# Patient Record
Sex: FEMALE | Race: OTHER | HISPANIC OR LATINO | Employment: FULL TIME | URBAN - METROPOLITAN AREA
[De-identification: names, ages, dates, MRNs, and addresses within clinical notes are randomized per-mention and may not be internally consistent; named-entity substitution may affect disease eponyms.]

---

## 2018-05-07 ENCOUNTER — OFFICE VISIT (OUTPATIENT)
Dept: URGENT CARE | Facility: PHYSICIAN GROUP | Age: 28
End: 2018-05-07
Payer: COMMERCIAL

## 2018-05-07 VITALS
BODY MASS INDEX: 25.49 KG/M2 | TEMPERATURE: 97.8 F | RESPIRATION RATE: 16 BRPM | SYSTOLIC BLOOD PRESSURE: 118 MMHG | WEIGHT: 153 LBS | OXYGEN SATURATION: 100 % | DIASTOLIC BLOOD PRESSURE: 80 MMHG | HEART RATE: 64 BPM | HEIGHT: 65 IN

## 2018-05-07 DIAGNOSIS — Z34.90 EARLY STAGE OF PREGNANCY: ICD-10-CM

## 2018-05-07 PROCEDURE — 99202 OFFICE O/P NEW SF 15 MIN: CPT | Performed by: FAMILY MEDICINE

## 2018-05-07 NOTE — PROGRESS NOTES
"Chief Complaint   Patient presents with   • Pregnancy Problem     at home hcg test positive,            LMP - \"sometime last year\".    Had Mirena IUD pulled 3/26    She took 2 urine pregnancy tests  - both positive.   She is here for confirmation.       Past medical history was unremarkable and not pertinent to current issue  Social hx - denies tobacco, alcohol, drug use  Family hx was reviewed - no pertinent past family hx        Review of Systems:  Review of Systems   Constitutional: Negative for fever.   HENT: no neck pain, headache or dizziness  Eyes: denies vision changes  Respiratory: no cough, congestion, SOB  Cardiovascular: denies palpations, chest pain   Gastrointestinal: denies diarrhea, abdominal pain or constipation.  No blood in stool.  Musculoskeletal: denies back pain or joint pain    Skin: no itching or rash  Neurological: No numbness or tingling.       OBJECTIVE  Blood pressure 118/80, pulse 64, temperature 36.6 °C (97.8 °F), resp. rate 16, height 1.651 m (5' 5\"), weight 69.4 kg (153 lb), last menstrual period 2018, SpO2 100 %.    HEENT - EOMI  Neuro - alert and oriented x3.    Lungs - CTA.    Heart - regular rate       Musculoskeletal - No lower extremity edema noted.  Urszula's sign negative bilaterally        A/P:      Urine Hcg positive  Advised prenatal vitamins qd    Referred to ob/gyn        "

## 2018-09-23 ENCOUNTER — HOSPITAL ENCOUNTER (OUTPATIENT)
Dept: RADIOLOGY | Facility: MEDICAL CENTER | Age: 28
End: 2018-09-23
Attending: OBSTETRICS & GYNECOLOGY
Payer: COMMERCIAL

## 2018-09-23 DIAGNOSIS — Z34.82 PRENATAL CARE, SUBSEQUENT PREGNANCY, SECOND TRIMESTER: ICD-10-CM

## 2018-09-23 PROCEDURE — 76805 OB US >/= 14 WKS SNGL FETUS: CPT

## 2018-12-31 ENCOUNTER — HOSPITAL ENCOUNTER (INPATIENT)
Facility: MEDICAL CENTER | Age: 28
LOS: 2 days | End: 2019-01-02
Attending: OBSTETRICS & GYNECOLOGY | Admitting: OBSTETRICS & GYNECOLOGY
Payer: COMMERCIAL

## 2018-12-31 LAB
ALBUMIN SERPL BCP-MCNC: 3.4 G/DL (ref 3.2–4.9)
ALBUMIN/GLOB SERPL: 1.1 G/DL
ALP SERPL-CCNC: 202 U/L (ref 30–99)
ALT SERPL-CCNC: 9 U/L (ref 2–50)
ANION GAP SERPL CALC-SCNC: 9 MMOL/L (ref 0–11.9)
AST SERPL-CCNC: 13 U/L (ref 12–45)
BASOPHILS # BLD AUTO: 0.4 % (ref 0–1.8)
BASOPHILS # BLD: 0.04 K/UL (ref 0–0.12)
BILIRUB SERPL-MCNC: 0.4 MG/DL (ref 0.1–1.5)
BUN SERPL-MCNC: 6 MG/DL (ref 8–22)
CALCIUM SERPL-MCNC: 9.1 MG/DL (ref 8.5–10.5)
CHLORIDE SERPL-SCNC: 107 MMOL/L (ref 96–112)
CO2 SERPL-SCNC: 20 MMOL/L (ref 20–33)
CREAT SERPL-MCNC: 0.5 MG/DL (ref 0.5–1.4)
CREAT UR-MCNC: 38.1 MG/DL
EOSINOPHIL # BLD AUTO: 0.18 K/UL (ref 0–0.51)
EOSINOPHIL NFR BLD: 1.8 % (ref 0–6.9)
ERYTHROCYTE [DISTWIDTH] IN BLOOD BY AUTOMATED COUNT: 44.3 FL (ref 35.9–50)
GLOBULIN SER CALC-MCNC: 3.2 G/DL (ref 1.9–3.5)
GLUCOSE SERPL-MCNC: 95 MG/DL (ref 65–99)
HCT VFR BLD AUTO: 38.4 % (ref 37–47)
HGB BLD-MCNC: 12.4 G/DL (ref 12–16)
HOLDING TUBE BB 8507: NORMAL
IMM GRANULOCYTES # BLD AUTO: 0.06 K/UL (ref 0–0.11)
IMM GRANULOCYTES NFR BLD AUTO: 0.6 % (ref 0–0.9)
LYMPHOCYTES # BLD AUTO: 2 K/UL (ref 1–4.8)
LYMPHOCYTES NFR BLD: 19.9 % (ref 22–41)
MCH RBC QN AUTO: 26.3 PG (ref 27–33)
MCHC RBC AUTO-ENTMCNC: 32.3 G/DL (ref 33.6–35)
MCV RBC AUTO: 81.5 FL (ref 81.4–97.8)
MONOCYTES # BLD AUTO: 0.49 K/UL (ref 0–0.85)
MONOCYTES NFR BLD AUTO: 4.9 % (ref 0–13.4)
NEUTROPHILS # BLD AUTO: 7.3 K/UL (ref 2–7.15)
NEUTROPHILS NFR BLD: 72.4 % (ref 44–72)
NRBC # BLD AUTO: 0 K/UL
NRBC BLD-RTO: 0 /100 WBC
PLATELET # BLD AUTO: 173 K/UL (ref 164–446)
PMV BLD AUTO: 13 FL (ref 9–12.9)
POTASSIUM SERPL-SCNC: 4 MMOL/L (ref 3.6–5.5)
PROT SERPL-MCNC: 6.6 G/DL (ref 6–8.2)
PROT UR-MCNC: 6.3 MG/DL (ref 0–15)
PROT/CREAT UR: 165 MG/G (ref 10–107)
RBC # BLD AUTO: 4.71 M/UL (ref 4.2–5.4)
SODIUM SERPL-SCNC: 136 MMOL/L (ref 135–145)
URATE SERPL-MCNC: 4.4 MG/DL (ref 1.9–8.2)
WBC # BLD AUTO: 10.1 K/UL (ref 4.8–10.8)

## 2018-12-31 PROCEDURE — A9270 NON-COVERED ITEM OR SERVICE: HCPCS | Performed by: OBSTETRICS & GYNECOLOGY

## 2018-12-31 PROCEDURE — 700111 HCHG RX REV CODE 636 W/ 250 OVERRIDE (IP)

## 2018-12-31 PROCEDURE — 304965 HCHG RECOVERY SERVICES

## 2018-12-31 PROCEDURE — 700105 HCHG RX REV CODE 258

## 2018-12-31 PROCEDURE — 84550 ASSAY OF BLOOD/URIC ACID: CPT

## 2018-12-31 PROCEDURE — 10907ZC DRAINAGE OF AMNIOTIC FLUID, THERAPEUTIC FROM PRODUCTS OF CONCEPTION, VIA NATURAL OR ARTIFICIAL OPENING: ICD-10-PCS | Performed by: OBSTETRICS & GYNECOLOGY

## 2018-12-31 PROCEDURE — 700102 HCHG RX REV CODE 250 W/ 637 OVERRIDE(OP): Performed by: OBSTETRICS & GYNECOLOGY

## 2018-12-31 PROCEDURE — 82570 ASSAY OF URINE CREATININE: CPT

## 2018-12-31 PROCEDURE — 84156 ASSAY OF PROTEIN URINE: CPT

## 2018-12-31 PROCEDURE — 80053 COMPREHEN METABOLIC PANEL: CPT

## 2018-12-31 PROCEDURE — 10H07YZ INSERTION OF OTHER DEVICE INTO PRODUCTS OF CONCEPTION, VIA NATURAL OR ARTIFICIAL OPENING: ICD-10-PCS | Performed by: OBSTETRICS & GYNECOLOGY

## 2018-12-31 PROCEDURE — 85025 COMPLETE CBC W/AUTO DIFF WBC: CPT

## 2018-12-31 PROCEDURE — 770002 HCHG ROOM/CARE - OB PRIVATE (112)

## 2018-12-31 PROCEDURE — 700111 HCHG RX REV CODE 636 W/ 250 OVERRIDE (IP): Performed by: OBSTETRICS & GYNECOLOGY

## 2018-12-31 PROCEDURE — 700105 HCHG RX REV CODE 258: Performed by: OBSTETRICS & GYNECOLOGY

## 2018-12-31 PROCEDURE — 59409 OBSTETRICAL CARE: CPT

## 2018-12-31 RX ORDER — ACETAMINOPHEN 325 MG/1
325 TABLET ORAL EVERY 4 HOURS PRN
Status: DISCONTINUED | OUTPATIENT
Start: 2018-12-31 | End: 2019-01-02 | Stop reason: HOSPADM

## 2018-12-31 RX ORDER — DOCUSATE SODIUM 100 MG/1
100 CAPSULE, LIQUID FILLED ORAL 2 TIMES DAILY PRN
Status: DISCONTINUED | OUTPATIENT
Start: 2018-12-31 | End: 2019-01-02 | Stop reason: HOSPADM

## 2018-12-31 RX ORDER — ONDANSETRON 2 MG/ML
4 INJECTION INTRAMUSCULAR; INTRAVENOUS EVERY 6 HOURS PRN
Status: DISCONTINUED | OUTPATIENT
Start: 2018-12-31 | End: 2019-01-02 | Stop reason: HOSPADM

## 2018-12-31 RX ORDER — SODIUM CHLORIDE, SODIUM LACTATE, POTASSIUM CHLORIDE, CALCIUM CHLORIDE 600; 310; 30; 20 MG/100ML; MG/100ML; MG/100ML; MG/100ML
INJECTION, SOLUTION INTRAVENOUS PRN
Status: DISCONTINUED | OUTPATIENT
Start: 2018-12-31 | End: 2019-01-02 | Stop reason: HOSPADM

## 2018-12-31 RX ORDER — MISOPROSTOL 200 UG/1
600 TABLET ORAL
Status: DISCONTINUED | OUTPATIENT
Start: 2018-12-31 | End: 2019-01-02 | Stop reason: HOSPADM

## 2018-12-31 RX ORDER — IBUPROFEN 600 MG/1
600 TABLET ORAL EVERY 6 HOURS PRN
Status: DISCONTINUED | OUTPATIENT
Start: 2018-12-31 | End: 2019-01-02 | Stop reason: HOSPADM

## 2018-12-31 RX ORDER — ONDANSETRON 4 MG/1
4 TABLET, ORALLY DISINTEGRATING ORAL EVERY 6 HOURS PRN
Status: DISCONTINUED | OUTPATIENT
Start: 2018-12-31 | End: 2019-01-02 | Stop reason: HOSPADM

## 2018-12-31 RX ORDER — SODIUM CHLORIDE, SODIUM LACTATE, POTASSIUM CHLORIDE, CALCIUM CHLORIDE 600; 310; 30; 20 MG/100ML; MG/100ML; MG/100ML; MG/100ML
INJECTION, SOLUTION INTRAVENOUS CONTINUOUS
Status: ACTIVE | OUTPATIENT
Start: 2018-12-31 | End: 2018-12-31

## 2018-12-31 RX ORDER — MISOPROSTOL 200 UG/1
800 TABLET ORAL
Status: COMPLETED | OUTPATIENT
Start: 2018-12-31 | End: 2018-12-31

## 2018-12-31 RX ORDER — HYDROCODONE BITARTRATE AND ACETAMINOPHEN 5; 325 MG/1; MG/1
1 TABLET ORAL EVERY 4 HOURS PRN
Status: DISCONTINUED | OUTPATIENT
Start: 2018-12-31 | End: 2019-01-02 | Stop reason: HOSPADM

## 2018-12-31 RX ORDER — OXYTOCIN 10 [USP'U]/ML
10 INJECTION, SOLUTION INTRAMUSCULAR; INTRAVENOUS
Status: DISCONTINUED | OUTPATIENT
Start: 2018-12-31 | End: 2018-12-31 | Stop reason: HOSPADM

## 2018-12-31 RX ORDER — HYDROCODONE BITARTRATE AND ACETAMINOPHEN 5; 325 MG/1; MG/1
2 TABLET ORAL EVERY 4 HOURS PRN
Status: DISCONTINUED | OUTPATIENT
Start: 2018-12-31 | End: 2019-01-02 | Stop reason: HOSPADM

## 2018-12-31 RX ORDER — SODIUM CHLORIDE, SODIUM LACTATE, POTASSIUM CHLORIDE, CALCIUM CHLORIDE 600; 310; 30; 20 MG/100ML; MG/100ML; MG/100ML; MG/100ML
INJECTION, SOLUTION INTRAVENOUS
Status: COMPLETED
Start: 2018-12-31 | End: 2018-12-31

## 2018-12-31 RX ORDER — OXYCODONE AND ACETAMINOPHEN 10; 325 MG/1; MG/1
1 TABLET ORAL EVERY 6 HOURS PRN
Status: DISCONTINUED | OUTPATIENT
Start: 2018-12-31 | End: 2019-01-02 | Stop reason: HOSPADM

## 2018-12-31 RX ADMIN — Medication 125 ML/HR: at 20:38

## 2018-12-31 RX ADMIN — AMPICILLIN SODIUM 1 G: 1 INJECTION, POWDER, FOR SOLUTION INTRAMUSCULAR; INTRAVENOUS at 16:15

## 2018-12-31 RX ADMIN — Medication 2000 ML/HR: at 19:49

## 2018-12-31 RX ADMIN — SODIUM CHLORIDE, POTASSIUM CHLORIDE, SODIUM LACTATE AND CALCIUM CHLORIDE: 600; 310; 30; 20 INJECTION, SOLUTION INTRAVENOUS at 11:47

## 2018-12-31 RX ADMIN — MISOPROSTOL 800 MCG: 200 TABLET ORAL at 20:08

## 2018-12-31 RX ADMIN — AMPICILLIN SODIUM 2000 MG: 2 INJECTION, POWDER, FOR SOLUTION INTRAMUSCULAR; INTRAVENOUS at 11:46

## 2018-12-31 RX ADMIN — FENTANYL CITRATE 100 MCG: 50 INJECTION, SOLUTION INTRAMUSCULAR; INTRAVENOUS at 18:23

## 2018-12-31 RX ADMIN — SODIUM CHLORIDE, SODIUM LACTATE, POTASSIUM CHLORIDE, CALCIUM CHLORIDE: 600; 310; 30; 20 INJECTION, SOLUTION INTRAVENOUS at 11:47

## 2018-12-31 RX ADMIN — Medication 2 MILLI-UNITS/MIN: at 11:54

## 2018-12-31 ASSESSMENT — PATIENT HEALTH QUESTIONNAIRE - PHQ9
2. FEELING DOWN, DEPRESSED, IRRITABLE, OR HOPELESS: NOT AT ALL
1. LITTLE INTEREST OR PLEASURE IN DOING THINGS: NOT AT ALL
SUM OF ALL RESPONSES TO PHQ9 QUESTIONS 1 AND 2: 0

## 2018-12-31 ASSESSMENT — COPD QUESTIONNAIRES
COPD SCREENING SCORE: 0
IN THE PAST 12 MONTHS DO YOU DO LESS THAN YOU USED TO BECAUSE OF YOUR BREATHING PROBLEMS: DISAGREE/UNSURE
DURING THE PAST 4 WEEKS HOW MUCH DID YOU FEEL SHORT OF BREATH: NONE/LITTLE OF THE TIME
HAVE YOU SMOKED AT LEAST 100 CIGARETTES IN YOUR ENTIRE LIFE: NO/DON'T KNOW
DO YOU EVER COUGH UP ANY MUCUS OR PHLEGM?: NO/ONLY WITH OCCASIONAL COLDS OR INFECTIONS

## 2018-12-31 ASSESSMENT — PAIN SCALES - GENERAL
PAINLEVEL_OUTOF10: 0
PAINLEVEL_OUTOF10: ASSUMED PAIN PRESENT

## 2018-12-31 ASSESSMENT — LIFESTYLE VARIABLES
ALCOHOL_USE: NO
EVER_SMOKED: NEVER

## 2018-12-31 NOTE — CARE PLAN
Problem: Pain  Goal: Alleviation of Pain or a reduction in pain to the patient's comfort goal    Intervention: Initial pain assessment  Pain management discussed and pt would like to labor naturally. Pain medication options discussed with use of  and all pt questions answered at this time.       Problem: Risk for Infection, Impaired Wound Healing  Goal: Remain free from signs and symptoms of infection  Outcome: PROGRESSING AS EXPECTED  Practice proper hand hygiene before and after entering room and after direct pt contact

## 2018-12-31 NOTE — H&P
History and Physical      Jackelyn Tobias is a 28 y.o. female  at  49 weeks who presents for  Labor     Subjective:   positive  For CTXS.   positive Feels pain   negative for LOF  negative for vaginal bleeding.   positive for fetal movement    ROS: Constitutional: negative  Cardiovascular: negative  Gastrointestinal: negative  Genitourinary:negative    History reviewed. No pertinent past medical history.  History reviewed. No pertinent surgical history.  OB History    Para Term  AB Living   1             SAB TAB Ectopic Molar Multiple Live Births                    # Outcome Date GA Lbr Gio/2nd Weight Sex Delivery Anes PTL Lv   1 Current                 Social History     Social History   • Marital status:      Spouse name: N/A   • Number of children: N/A   • Years of education: N/A     Occupational History   • Not on file.     Social History Main Topics   • Smoking status: Never Smoker   • Smokeless tobacco: Never Used   • Alcohol use No   • Drug use: No   • Sexual activity: Not on file     Other Topics Concern   • Not on file     Social History Narrative   • No narrative on file     Allergies: Patient has no known allergies.    Current Facility-Administered Medications:   •  ampicillin (OMNIPEN) 2,000 mg in  mL IVPB, 2,000 mg, Intravenous, Once, Winsome Rodriguez M.D.  •  ampicillin (OMNIPEN) 1 g in  mL IVPB, 1,000 mg, Intravenous, Q4HRS, Winsome Rodriguez M.D.  •  LACTATED RINGERS IV SOLN, , , ,   •  oxytocin (PITOCIN) 20 UNITS/1000ML LR, , , ,   •  LR infusion, , Intravenous, Continuous, Winsome Rodriguez M.D.  •  fentaNYL (SUBLIMAZE) injection 100 mcg, 100 mcg, Intravenous, Q HOUR PRN, Winsome Rodriguez M.D.  •  oxytocin (PITOCIN) injection 10 Units, 10 Units, Intramuscular, Once PRN, Winsome Rodriguez M.D.  •  miSOPROStol (CYTOTEC) tablet 800 mcg, 800 mcg, Rectal, Once PRN, Winsome Rodriguez M.D.    Prenatal care with . starting at [facility]  "trimester  with following problems:  There are no active problems to display for this patient.              Objective:      Blood pressure 137/99, pulse 74, temperature 35.9 °C (96.7 °F), temperature source Temporal, height 1.651 m (5' 5\"), weight 87.1 kg (192 lb), last menstrual period 03/12/2018.    General:   no acute distress, alert, cooperative   Skin:   normal   HEENT:  PERRLA   Lungs:   CTA bilateral   Heart:   S1, S2 normal, no murmur, click, rub or gallop, regular rate and rhythm, brisk carotid upstroke without bruits, peripheral pulses very brisk, chest is clear without rales or wheezing, no pedal edema, no JVD, no hepatosplenomegaly   Abdomen:   gravid, NT   EFW:  3400gms.   Pelvis:  adequate with gynecoid pelvis   FHT:  144 BPM   Uterine Size: S=D   Presentations: Cephalic   Cervix:     Dilation: 2cm    Effacement: 100%    Station:  -3    Consistency: Soft    Position: Middle     Lab Review  Lab:    No results found for this or any previous visit (from the past 5880 hour(s)).     Assessment:   Jackelyn Tobias at Unknown  Labor status: Early latent labor.  Obstetrical history significant for There are no active problems to display for this patient.  .      Plan:     Admit to L&D  GBS positive             "

## 2018-12-31 NOTE — PROGRESS NOTES
1020- Report received from HERI Troncoso. Pt resting in bed with family at bedside. EFM and TOCO reading well. Pt oriented to room. POC discussed and assumed.   1238- Dr Rodriguez at bedside to assess pt. AROM clear fluid. IUPC placed, pt tolerated procedure well.   1610- Dr Rodriguez called. Informed her about the issue with the IUPC not staying placed correctly. She will be up to assess shortly.   1635- Dr Rodriguez at bedside to assess pt. SVE 4/90/-2. IUPC replaced but still not reading well. Orders to replace external TOCO and see if that will read well.  1820- SVE 7/100/0.    1823- IV Fentanyl given per pt request.  1850- Pt feeling pressure. SVE Lip/+1. Dr Rodriguez called and requested for delivery. Report given to HERI Villatoro.   1900- Pt complete.

## 2019-01-01 LAB
ERYTHROCYTE [DISTWIDTH] IN BLOOD BY AUTOMATED COUNT: 44.2 FL (ref 35.9–50)
HCT VFR BLD AUTO: 29.2 % (ref 37–47)
HGB BLD-MCNC: 9.4 G/DL (ref 12–16)
MCH RBC QN AUTO: 26.3 PG (ref 27–33)
MCHC RBC AUTO-ENTMCNC: 32.2 G/DL (ref 33.6–35)
MCV RBC AUTO: 81.8 FL (ref 81.4–97.8)
PLATELET # BLD AUTO: 142 K/UL (ref 164–446)
PMV BLD AUTO: 12.9 FL (ref 9–12.9)
RBC # BLD AUTO: 3.57 M/UL (ref 4.2–5.4)
WBC # BLD AUTO: 16.8 K/UL (ref 4.8–10.8)

## 2019-01-01 PROCEDURE — 700102 HCHG RX REV CODE 250 W/ 637 OVERRIDE(OP): Performed by: OBSTETRICS & GYNECOLOGY

## 2019-01-01 PROCEDURE — 85027 COMPLETE CBC AUTOMATED: CPT

## 2019-01-01 PROCEDURE — 770002 HCHG ROOM/CARE - OB PRIVATE (112)

## 2019-01-01 PROCEDURE — A9270 NON-COVERED ITEM OR SERVICE: HCPCS | Performed by: OBSTETRICS & GYNECOLOGY

## 2019-01-01 PROCEDURE — 36415 COLL VENOUS BLD VENIPUNCTURE: CPT

## 2019-01-01 RX ADMIN — IBUPROFEN 600 MG: 600 TABLET, FILM COATED ORAL at 00:01

## 2019-01-01 ASSESSMENT — PAIN SCALES - GENERAL
PAINLEVEL_OUTOF10: 0
PAINLEVEL_OUTOF10: 4
PAINLEVEL_OUTOF10: 0

## 2019-01-01 ASSESSMENT — PATIENT HEALTH QUESTIONNAIRE - PHQ9
SUM OF ALL RESPONSES TO PHQ9 QUESTIONS 1 AND 2: 0
2. FEELING DOWN, DEPRESSED, IRRITABLE, OR HOPELESS: NOT AT ALL
1. LITTLE INTEREST OR PLEASURE IN DOING THINGS: NOT AT ALL

## 2019-01-01 NOTE — PROGRESS NOTES
Pt transferred to floor from labor and delivery.  Pt oriented to room, safety procedures and call light. Pt is A&Ox4. Denies pain.  Assessment complete. Fundus firm, lochia light.  States voiding w/out difficulty, - for flatus. Plan of care reviewed, call light enc.  Family @ bedside, bonding well w/.

## 2019-01-01 NOTE — PROGRESS NOTES
Doing well, with  at the bedside.  Pt requested wants to take shower today. Provided stuffs needed.  Denies pain, coping well.

## 2019-01-01 NOTE — CARE PLAN
Problem: Pain  Goal: Alleviation of Pain or a reduction in pain to the patient's comfort goal  Outcome: PROGRESSING AS EXPECTED  Pain well controlled. Pt able to breathe through delivery well with family support and RN/MD guidance. Ice provided after delivery, declines medication need.     Problem: Risk for Infection, Impaired Wound Healing  Goal: Remain free from signs and symptoms of infection  Outcome: PROGRESSING AS EXPECTED  Pt free from s/s of infection.

## 2019-01-01 NOTE — PROGRESS NOTES
1900 - Report received from Daniela LIRIANO. Pt complete with strong urge to push with each UC. Dr. Rodriguez on her way to delivery.   1917 - Pt began pushing due to strong urge.   1925 - Dr. Rodriguez at bedside for delivery.   1930 - Bladder drained by MD with straight cath.  1947 - Delivery of viable male. 8/9 APGARS.   1949 - Placenta delivered.   2008 - Cytotec given, after two fundal rubs having gush of moderate rubra, fundus firm. Pt denies any need for pain medication, ice pack provided.   2125 - Bleeding better after dose of Cytotec, scant bleeding, fundus remains firm. Pt up to bathroom, able to void. Belongings gathered. Pt transferred up to postpartum with family. Report given to Lory LIRIANO.

## 2019-01-01 NOTE — CARE PLAN
Problem: Communication  Goal: The ability to communicate needs accurately and effectively will improve  Pt educated on use of call light and white board for communication, pt verbalizes understanding of white board communication and times of rounding.      Problem: Pain Management  Goal: Pain level will decrease to patient's comfort goal  Pain assessed Q2h. Pain medication given per orders, see MAR.

## 2019-01-01 NOTE — PROGRESS NOTES
"Jackelyn Tobias PP day 1    Subjective: Abdominal pain. no, ambulating .yes, tolerating liquids .yes, tolerating regular diet .yes, flatus.yes, BM .yes, Bleeding .yes, voiding .yes,dizziness .no, breast feeding.yes, breast tenderness .no    Blood pressure 123/79, pulse 80, temperature 36.7 °C (98.1 °F), temperature source Temporal, resp. rate 18, height 1.651 m (5' 5\"), weight 87.1 kg (192 lb), last menstrual period 03/12/2018, SpO2 96 %, currently breastfeeding.  Breast Exam: Tenderness .no, Engourgement .no, Mastitis .no  Abdomen soft, non-tender. BS normal. No masses,  No organomegaly  Fundus Tenderness:no, Below umbilicus:Yes,   Perineumperineum intact  Extremities2+ edema    Meds:   No current facility-administered medications on file prior to encounter.      No current outpatient prescriptions on file prior to encounter.       Lab:   Recent Results (from the past 48 hour(s))   PROTEIN/CREAT RATIO URINE    Collection Time: 12/31/18 10:00 AM   Result Value Ref Range    Total Protein, Urine 6.3 0.0 - 15.0 mg/dL    Creatinine, Random Urine 38.10 mg/dL    Protein Creatinine Ratio 165 (H) 10 - 107 mg/g   CBC WITH DIFFERENTIAL    Collection Time: 12/31/18 10:50 AM   Result Value Ref Range    WBC 10.1 4.8 - 10.8 K/uL    RBC 4.71 4.20 - 5.40 M/uL    Hemoglobin 12.4 12.0 - 16.0 g/dL    Hematocrit 38.4 37.0 - 47.0 %    MCV 81.5 81.4 - 97.8 fL    MCH 26.3 (L) 27.0 - 33.0 pg    MCHC 32.3 (L) 33.6 - 35.0 g/dL    RDW 44.3 35.9 - 50.0 fL    Platelet Count 173 164 - 446 K/uL    MPV 13.0 (H) 9.0 - 12.9 fL    Neutrophils-Polys 72.40 (H) 44.00 - 72.00 %    Lymphocytes 19.90 (L) 22.00 - 41.00 %    Monocytes 4.90 0.00 - 13.40 %    Eosinophils 1.80 0.00 - 6.90 %    Basophils 0.40 0.00 - 1.80 %    Immature Granulocytes 0.60 0.00 - 0.90 %    Nucleated RBC 0.00 /100 WBC    Neutrophils (Absolute) 7.30 (H) 2.00 - 7.15 K/uL    Lymphs (Absolute) 2.00 1.00 - 4.80 K/uL    Monos (Absolute) 0.49 0.00 - 0.85 K/uL    Eos (Absolute) " 0.18 0.00 - 0.51 K/uL    Baso (Absolute) 0.04 0.00 - 0.12 K/uL    Immature Granulocytes (abs) 0.06 0.00 - 0.11 K/uL    NRBC (Absolute) 0.00 K/uL   COMP METABOLIC PANEL    Collection Time: 12/31/18 10:50 AM   Result Value Ref Range    Sodium 136 135 - 145 mmol/L    Potassium 4.0 3.6 - 5.5 mmol/L    Chloride 107 96 - 112 mmol/L    Co2 20 20 - 33 mmol/L    Anion Gap 9.0 0.0 - 11.9    Glucose 95 65 - 99 mg/dL    Bun 6 (L) 8 - 22 mg/dL    Creatinine 0.50 0.50 - 1.40 mg/dL    Calcium 9.1 8.5 - 10.5 mg/dL    AST(SGOT) 13 12 - 45 U/L    ALT(SGPT) 9 2 - 50 U/L    Alkaline Phosphatase 202 (H) 30 - 99 U/L    Total Bilirubin 0.4 0.1 - 1.5 mg/dL    Albumin 3.4 3.2 - 4.9 g/dL    Total Protein 6.6 6.0 - 8.2 g/dL    Globulin 3.2 1.9 - 3.5 g/dL    A-G Ratio 1.1 g/dL   URIC ACID    Collection Time: 12/31/18 10:50 AM   Result Value Ref Range    Uric Acid 4.4 1.9 - 8.2 mg/dL   HOLD BLOOD BANK SPECIMEN (NOT TESTED)    Collection Time: 12/31/18 10:50 AM   Result Value Ref Range    Holding Tube - Bb DONE    ESTIMATED GFR    Collection Time: 12/31/18 10:50 AM   Result Value Ref Range    GFR If African American >60 >60 mL/min/1.73 m 2    GFR If Non African American >60 >60 mL/min/1.73 m 2   CBC without differential    Collection Time: 01/01/19  4:08 AM   Result Value Ref Range    WBC 16.8 (H) 4.8 - 10.8 K/uL    RBC 3.57 (L) 4.20 - 5.40 M/uL    Hemoglobin 9.4 (L) 12.0 - 16.0 g/dL    Hematocrit 29.2 (L) 37.0 - 47.0 %    MCV 81.8 81.4 - 97.8 fL    MCH 26.3 (L) 27.0 - 33.0 pg    MCHC 32.2 (L) 33.6 - 35.0 g/dL    RDW 44.2 35.9 - 50.0 fL    Platelet Count 142 (L) 164 - 446 K/uL    MPV 12.9 9.0 - 12.9 fL       Assessment and Plan  normal postpartum course  No heavy bleeding or foul vaginal discharge     Continue Routine postpartum care

## 2019-01-01 NOTE — L&D DELIVERY NOTE
Delivery Note    Obstetrician:   Jennifer.    Pre-Delivery Diagnosis: term     Post-Delivery Diagnosis: Living  infant(s) or Male    Procedure: Spontaneous vaginal delivery     came  In early Labor after AROM and pitocin augmentation progrseed to full dilatation with just fentanyl 1dose for pain.   head delivered in occipito-anterior postion mouth and nose bulb suctioned and rest of the body delivered. Baby cried immediately.   cord clamped and cut.       Episiotomy or Incision: none    Indications for instrumental delivery: none    Infant Wt:pending.    Apgars: 1' 8  /  5' 9     Placenta and Cord:          Mechanism: spontaneous        Description:  complete, 3 vessel cord, membranes intact    Estimated Blood Loss:  200 ml           Total IV Fluids: 1500ml           Specimens: none.           Complications:  none           Condition: stable      Infant Feeding:    breast    Attending Attestation: I was present and scrubbed for the entire procedure.

## 2019-01-02 VITALS
RESPIRATION RATE: 17 BRPM | DIASTOLIC BLOOD PRESSURE: 80 MMHG | HEIGHT: 65 IN | OXYGEN SATURATION: 97 % | BODY MASS INDEX: 31.99 KG/M2 | HEART RATE: 87 BPM | SYSTOLIC BLOOD PRESSURE: 120 MMHG | WEIGHT: 192 LBS | TEMPERATURE: 97.8 F

## 2019-01-02 PROCEDURE — A9270 NON-COVERED ITEM OR SERVICE: HCPCS | Performed by: OBSTETRICS & GYNECOLOGY

## 2019-01-02 PROCEDURE — 700102 HCHG RX REV CODE 250 W/ 637 OVERRIDE(OP): Performed by: OBSTETRICS & GYNECOLOGY

## 2019-01-02 RX ADMIN — IBUPROFEN 600 MG: 600 TABLET, FILM COATED ORAL at 08:07

## 2019-01-02 ASSESSMENT — PATIENT HEALTH QUESTIONNAIRE - PHQ9
2. FEELING DOWN, DEPRESSED, IRRITABLE, OR HOPELESS: NOT AT ALL
SUM OF ALL RESPONSES TO PHQ9 QUESTIONS 1 AND 2: 0
1. LITTLE INTEREST OR PLEASURE IN DOING THINGS: NOT AT ALL

## 2019-01-02 ASSESSMENT — EDINBURGH POSTNATAL DEPRESSION SCALE (EPDS)
THE THOUGHT OF HARMING MYSELF HAS OCCURRED TO ME: NEVER
I HAVE BEEN ANXIOUS OR WORRIED FOR NO GOOD REASON: NO, NOT AT ALL
I HAVE BLAMED MYSELF UNNECESSARILY WHEN THINGS WENT WRONG: NO, NEVER
I HAVE BEEN ABLE TO LAUGH AND SEE THE FUNNY SIDE OF THINGS: AS MUCH AS I ALWAYS COULD
I HAVE BEEN SO UNHAPPY THAT I HAVE BEEN CRYING: NO, NEVER
I HAVE FELT SCARED OR PANICKY FOR NO GOOD REASON: NO, NOT AT ALL
I HAVE FELT SAD OR MISERABLE: NO, NOT AT ALL
I HAVE LOOKED FORWARD WITH ENJOYMENT TO THINGS: AS MUCH AS I EVER DID
THINGS HAVE BEEN GETTING ON TOP OF ME: NO, I HAVE BEEN COPING AS WELL AS EVER
I HAVE BEEN SO UNHAPPY THAT I HAVE HAD DIFFICULTY SLEEPING: NOT AT ALL

## 2019-01-02 ASSESSMENT — PAIN SCALES - GENERAL
PAINLEVEL_OUTOF10: 0
PAINLEVEL_OUTOF10: 2
PAINLEVEL_OUTOF10: 0

## 2019-01-02 NOTE — PROGRESS NOTES
0700- Report received from KLAUDIA Wallace, RN. Pt sleeping with pt's mother at the bedside.   0800- pt provided with EPDS, pt denies depression at this time. Pt and family educated on S/S of PPD and resources for depression, pt encouraged to notify physician if any symptoms are present.  0900- EPDS score- 0.  1115- pt requesting to receive consult with , Linda notified.  1130- Linda at the bedside, pt provided with pediatrician list, WIC information, and Medicaid information provided, all questions answered by .  1225- Dr. Rodriguez discharged pt home. Vesna SMITH, HERI to provide discharge instructions.

## 2019-01-02 NOTE — CARE PLAN
Problem: Infection  Goal: Will remain free from infection    Intervention: Implement standard precautions and perform hand washing before and after patient contact  Pt and family encouraged to utilize hand  to prevent risk of infection.       Problem: Fluid Volume:  Goal: Will maintain balanced intake and output    Intervention: Monitor, educate, and encourage compliance with therapeutic intake of liquids  Pt encouraged to drinks fluids to maintain fluid balance.

## 2019-01-02 NOTE — DISCHARGE PLANNING
:    Notified by RN patient is requesting to speak with SW.  Met with patient, FOB, and family member at the bedside.  MOB stated she would like a list of pediatricians for infant and states she does not have insurance for infant.  Asked MOB about her insurance-[employer] and MOB stated it's too expensive to add baby onto it.  Provided MOB with a children and family resource list with the phone number to the Medicaid office.  Gave mother a list of pediatricians and also information on applying for WIC.  MOB denies needing any additional resources.      Plan:  Nothing further.

## 2019-01-02 NOTE — CARE PLAN
Problem: Infection  Goal: Will remain free from infection  Outcome: PROGRESSING AS EXPECTED  Pt exhibits no signs of infection, afebrile.    Problem: Pain Management  Goal: Pain level will decrease to patient's comfort goal  Outcome: PROGRESSING AS EXPECTED  Pt denies discomfort at this time. States Motrin is effective as needed.

## 2019-01-02 NOTE — DISCHARGE SUMMARY
Discharge Summary:      Jackelyn Tobias      Admit Date:   2018  Discharge Date:  2019     Admitting diagnosis:  Pregnancy  Labor and delivery, indication for care  Discharge Diagnosis: Status post vaginal, spontaneous.  Pregnancy Complications: none  Tubal Ligation:  no        History:  History reviewed. No pertinent past medical history.  OB History    Para Term  AB Living   1 1 1     1   SAB TAB Ectopic Molar Multiple Live Births           0 1      # Outcome Date GA Lbr Gio/2nd Weight Sex Delivery Anes PTL Lv   1 Term 18 39w5d / 00:47 3.125 kg (6 lb 14.2 oz) M Vag-Spont None N QUIN           Patient has no known allergies.  There are no active problems to display for this patient.       Hospital Course:   28 y.o. , now para 1, was admitted with the above mentioned diagnosis, underwent Active Labor, vaginal, spontaneous. Patient postpartum course was unremarkable, with progressive advancement in diet , ambulation and toleration of oral analgesia. Patient without complaints today and desires discharge.      Vitals:    19 0400 19 0800 19 2000 19 0800   BP: 125/72 123/79 137/82 120/80   Pulse: 85 80 100 87   Resp:    Temp: 36.7 °C (98 °F) 36.7 °C (98.1 °F) 37.1 °C (98.7 °F) 36.6 °C (97.8 °F)   TempSrc: Temporal Temporal Temporal Temporal   SpO2: 96% 96% 96% 97%   Weight:       Height:           Current Facility-Administered Medications   Medication Dose   • ondansetron (ZOFRAN ODT) dispertab 4 mg  4 mg    Or   • ondansetron (ZOFRAN) syringe/vial injection 4 mg  4 mg   • oxytocin (PITOCIN) infusion (for postpartum)   mL/hr   • HYDROcodone-acetaminophen (NORCO) 5-325 MG per tablet 1 Tab  1 Tab   • acetaminophen (TYLENOL) tablet 325 mg  325 mg   • HYDROcodone-acetaminophen (NORCO) 5-325 MG per tablet 2 Tab  2 Tab   • oxytocin (PITOCIN) 20 UNITS/1000ML LR (induction of labor)  0.5-20 pamela-units/min   • LR infusion     • PRN oxytocin  (PITOCIN) (20 Units/1000 mL) PRN for excessive uterine bleeding - See Admin Instr  125-999 mL/hr   • miSOPROStol (CYTOTEC) tablet 600 mcg  600 mcg   • docusate sodium (COLACE) capsule 100 mg  100 mg   • oxyCODONE-acetaminophen (PERCOCET-10)  MG per tablet 1 Tab  1 Tab   • ibuprofen (MOTRIN) tablet 600 mg  600 mg       Exam:  Breast Exam: negative  Abdomen: Abdomen soft, non-tender. BS normal. No masses,  No organomegaly  Fundus Non Tender: yes  Perineum: perineum intact  Extremity: extremities, peripheral pulses and reflexes normal     Labs:  Recent Labs      12/31/18   1050  01/01/19   0408   WBC  10.1  16.8*   RBC  4.71  3.57*   HEMOGLOBIN  12.4  9.4*   HEMATOCRIT  38.4  29.2*   MCV  81.5  81.8   MCH  26.3*  26.3*   MCHC  32.3*  32.2*   RDW  44.3  44.2   PLATELETCT  173  142*   MPV  13.0*  12.9        Activity:   Discharge to home  Pelvic Rest x 6 weeks    Assessment:  normal postpartum course  Discharge Assessment: No heavy bleeding or foul vaginal discharge      Follow up: [facility] in 5 weeks for vaginal .     Discharge Meds:   No current outpatient prescriptions on file.       Winsome Rodriguez M.D.

## 2019-01-02 NOTE — PROGRESS NOTES
1900-Assumed care of pt, POC discussed. Pt concerned about breastfeeding. Education provided. Nurse assist infant to latch to right side. FOB at bedside providing emotional support.  VSS. Fundus firm, lochia scant. Pt ambulating to BRP with ease.     2000- Pt states she would prefer to use formula for the baby. Reinforced breastfeeding education with pt, validated pt feelings. Gave Similac and instructed on amounts to feed and time frames. Pt verbalized understanding. Verbalized relief.     0620- Mom in bed with baby at side. Reinforced safety issues with having baby in bed with Mom. Pt verbalized understanding. States she will put baby in open crib to sleep.

## 2019-01-02 NOTE — DISCHARGE INSTRUCTIONS
POSTPARTUM DISCHARGE INSTRUCTIONS FOR MOM    YOB: 1990   Age: 28 y.o.               Admit Date: 12/31/2018     Discharge Date: 1/2/2019  Attending Doctor:  Winsome Rodriguez M.D.                  Allergies:  Patient has no known allergies.    Discharged to home by car. Discharged via wheelchair, hospital escort: Yes.  Special equipment needed: Not Applicable  Belongings with: Personal  Be sure to schedule a follow-up appointment with your primary care doctor or any specialists as instructed.     Discharge Plan:   Diet Plan: Discussed  Activity Level: Discussed  Confirmed Follow up Appointment: Patient to Call and Schedule Appointment  Confirmed Symptoms Management: Discussed  Medication Reconciliation Updated: Yes  Influenza Vaccine Indication: Patient Refuses    REASONS TO CALL YOUR OBSTETRICIAN:  1.   Persistent fever or shaking chills (Temperature higher than 100.4)  2.   Heavy bleeding (soaking more than 1 pad per hour); Passing clots  3.   Foul odor from vagina  4.   Mastitis (Breast infection; breast pain, chills, fever, redness)  5.   Urinary pain, burning or frequency  6.   Episiotomy infection    8.   Severe depression longer than 24 hours    HAND WASHING  · Prior to handling the baby.  · Before breastfeeding or bottle feeding baby.  · After using the bathroom or changing the baby's diaper.        VAGINAL CARE  · Nothing inside vagina for 6 weeks: no sexual intercourse, tampons or douching.  · Bleeding may continue for 2-4 weeks.  Amount may vary.    · Call your physician for heavy bleeding which means soaking more than 1 pad per hour    BIRTH CONTROL  · It is possible to become pregnant at any time after delivery and while breastfeeding.  · Plan to discuss a method of birth control with your physician at your follow up visit. visit.    DIET AND ELIMINATION  · Eating more fiber (bran cereal, fruits, and vegetables) and drinking plenty of fluids will help to avoid constipation.  · Urinary  "frequency after childbirth is normal.    POSTPARTUM BLUES  During the first few days after birth, you may experience a sense of the \"blues\" which may include impatience, irritability or even crying.  These feeling come and go quickly.  However, as many as 1 in 10 women experience emotional symptoms known as postpartum depression.    Postpartum depression:  May start as early as the second or third day after delivery or take several weeks or months to develop.  Symptoms of \"blues\" are present, but are more intense:  Crying spells; loss of appetite; feelings of hopelessness or loss of control; fear of touching the baby; over concern or no concern at all about the baby; little or no concern about your own appearance/caring for yourself; and/or inability to sleep or excessive sleeping.  Contact your physician if you are experiencing any of these symptoms.    Crisis Hotline:  · [place] Crisis Hotline:  7-630-DWIVBIH  Or 1-324.442.4611  · [place] Crisis Hotline:  1-627.103.3989  Or 563-975-7961    PREVENTING SHAKEN BABY:  If you are angry or stressed, PUT THE BABY IN THE CRIB, step away, take some deep breaths, and wait until you are calm to care for the baby.  DO NOT SHAKE THE BABY.  You are not alone, call a supporter for help.    · Crisis Call Center 24/7 crisis line 398-484-9923 or 1-732.827.2140  · You can also text them, text \"ANSWER\" to 429971    QUIT SMOKING/TOBACCO USE:  I understand the use of any tobacco products increases my chance of suffering from future heart disease and could cause other illnesses which may shorten my life. Quitting the use of tobacco products is the single most important thing I can do to improve my health. For further information on smoking / tobacco cessation call a Toll Free Quit Line at 1-261.742.3873 (*National Cancer [place]) or 1-790.605.5895 (American Lung Association) or you can access the web based program at www.lungusa.org.    · [place] Tobacco Users Help Line:  (094) " 707-2028       Toll Free: 9-416-721-2680  · Quit Tobacco Program [facility] (253)939-8116    DEPRESSION / SUICIDE RISK:  As you are discharged from this [facility] facility, it is important to learn how to keep safe from harming yourself.    Recognize the warning signs:  · Abrupt changes in personality, positive or negative- including increase in energy   · Giving away possessions  · Change in eating patterns- significant weight changes-  positive or negative  · Change in sleeping patterns- unable to sleep or sleeping all the time   · Unwillingness or inability to communicate  · Depression  · Unusual sadness, discouragement and loneliness  · Talk of wanting to die  · Neglect of personal appearance   · Rebelliousness- reckless behavior  · Withdrawal from people/activities they love  · Confusion- inability to concentrate     If you or a loved one observes any of these behaviors or has concerns about self-harm, here's what you can do:  · Talk about it- your feelings and reasons for harming yourself  · Remove any means that you might use to hurt yourself (examples: pills, rope, extension cords, firearm)  · Get professional help from the community (Mental Health, Substance Abuse, psychological counseling)  · Do not be alone:Call your Safe Contact- someone whom you trust who will be there for you.  · Call your local CRISIS HOTLINE 998-2470 or 210-942-2426  · Call your local Children's Mobile Crisis Response Team [place] (647) 921-2310 or www.Thinkature  · Call the toll free National Suicide Prevention Hotlines   · National Suicide Prevention Lifeline 226-411-NHRQ (8163)  · National Hope Line Network 800-SUICIDE (874-2141)    DISCHARGE SURVEY:  Thank you for choosing [facility].  We hope we provided you with very good care.  You may be receiving a survey in the mail.  Please fill it out.  Your opinion is valuable to us.    ADDITIONAL EDUCATIONAL MATERIALS GIVEN TO PATIENT:        My  signature on this form indicates that:  1.  I have reviewed and understand the above information  2.  My questions regarding this information have been answered to my satisfaction.  3.  I have formulated a plan with my discharge nurse to obtain my prescribed medication for home.

## 2020-01-27 ENCOUNTER — INITIAL PRENATAL (OUTPATIENT)
Dept: OBGYN | Facility: CLINIC | Age: 30
End: 2020-01-27

## 2020-01-27 VITALS
WEIGHT: 162 LBS | SYSTOLIC BLOOD PRESSURE: 122 MMHG | BODY MASS INDEX: 26.99 KG/M2 | HEIGHT: 65 IN | DIASTOLIC BLOOD PRESSURE: 72 MMHG

## 2020-01-27 DIAGNOSIS — N93.8 DUB (DYSFUNCTIONAL UTERINE BLEEDING): ICD-10-CM

## 2020-01-27 DIAGNOSIS — Z32.01 PREGNANCY EXAMINATION OR TEST, POSITIVE RESULT: Primary | ICD-10-CM

## 2020-01-27 LAB
INT CON NEG: NEGATIVE
INT CON POS: POSITIVE
POC URINE PREGNANCY TEST: POSITIVE

## 2020-01-27 PROCEDURE — 81025 URINE PREGNANCY TEST: CPT | Performed by: NURSE PRACTITIONER

## 2020-01-27 PROCEDURE — 99202 OFFICE O/P NEW SF 15 MIN: CPT | Performed by: NURSE PRACTITIONER

## 2020-01-27 SDOH — HEALTH STABILITY: MENTAL HEALTH: HOW OFTEN DO YOU HAVE A DRINK CONTAINING ALCOHOL?: NEVER

## 2020-01-27 NOTE — PROGRESS NOTES
"Jackelyn Tobias,  30 y.o.  female with Patient's last menstrual period was 10/03/2019 (exact date). presents today with complaint of dysfunctional uterine bleeding.    Subjective : Patient presents to the office for absent menses.    Nausea/Vomiting: Sometimes    Abdominal /pelvic cramping: No  Vaginal bleeding: one time, several weeks ago  Positive home UPT yes  Partner involved  Other symptoms: tiredness    Pertinent positives documented in HPI and all other systems reviewed & are negative    OB History    Para Term  AB Living   2 1 1     1   SAB TAB Ectopic Molar Multiple Live Births           0 1      # Outcome Date GA Lbr Gio/2nd Weight Sex Delivery Anes PTL Lv   2 Current            1 Term 18 39w5d / 00:47 3.125 kg (6 lb 14.2 oz) M Vag-Spont None N QUIN       Past Gyn history: last pap , hx STDs no    History reviewed. No pertinent past medical history.    History reviewed. No pertinent surgical history.    Meds: prenatal vitamins    Allergies: Patient has no known allergies.    Physical Exam:    /72   Ht 1.651 m (5' 5\")   Wt 73.5 kg (162 lb)   LMP 10/03/2019 (Exact Date)   BMI 26.96 kg/m²   Gen: well-appearing, well-hydrated, well-nourished, in no apparent distress, pleasant and verbal  Lungs: Clear  Heart: RRR No M  Abd: abdomen is soft without significant tenderness, masses, organomegaly or guarding  Pelvic: deferred  Ext: NT bilaterally, no cyanosis, clubbing or edema    Recent Results (from the past 336 hour(s))   POCT Pregnancy    Collection Time: 20 10:37 AM   Result Value Ref Range    POC Urine Pregnancy Test POSITIVE Negative    Internal Control Positive Positive     Internal Control Negative Negative        Ultrasound: deferred  Doppler: FHTs-150    Assessment:  30 y.o.   amenorrhea  Pregnancy exam/test positive    Plan:  2 weeks for new OB appt  Pap smear at NOB  Normal pregnancy symptoms discussed  SAB/labor precautions " educated  Prenatal labs and OB US ordered, pt to have drawn after financial appt  Drink at least 2 liters of water daily  Exercise 30 minutes daily  Call MD w/ questions or concerns

## 2020-01-27 NOTE — PROGRESS NOTES
New Pt/Pregnancy test  Positive UPT today done in clinic.   LMP: 10/03/2019  WT: 162 lb  BP: 122/72  Pt states in November she had one episode of vaginal bleeding, states she has not had any bleeding or spotting since.   Good # [phone]

## 2020-02-04 ENCOUNTER — TELEPHONE (OUTPATIENT)
Dept: OBGYN | Facility: CLINIC | Age: 30
End: 2020-02-04

## 2020-02-05 NOTE — TELEPHONE ENCOUNTER
Pt LM on VM stating she is unable to come to her appt tomorrow and requesting to cancel appt.  Appt canceled

## 2020-05-08 ENCOUNTER — HOSPITAL ENCOUNTER (OUTPATIENT)
Facility: MEDICAL CENTER | Age: 30
End: 2020-05-08
Attending: NURSE PRACTITIONER
Payer: COMMERCIAL

## 2020-05-08 ENCOUNTER — OFFICE VISIT (OUTPATIENT)
Dept: OBGYN | Facility: CLINIC | Age: 30
End: 2020-05-08

## 2020-05-08 ENCOUNTER — INITIAL PRENATAL (OUTPATIENT)
Dept: OBGYN | Facility: CLINIC | Age: 30
End: 2020-05-08

## 2020-05-08 VITALS — SYSTOLIC BLOOD PRESSURE: 116 MMHG | DIASTOLIC BLOOD PRESSURE: 78 MMHG | BODY MASS INDEX: 24.86 KG/M2 | WEIGHT: 149.4 LBS

## 2020-05-08 DIAGNOSIS — Z34.83 ENCOUNTER FOR SUPERVISION OF OTHER NORMAL PREGNANCY, THIRD TRIMESTER: ICD-10-CM

## 2020-05-08 DIAGNOSIS — E04.9 ENLARGED THYROID: ICD-10-CM

## 2020-05-08 DIAGNOSIS — O09.33 LATE PRENATAL CARE AFFECTING PREGNANCY IN THIRD TRIMESTER: ICD-10-CM

## 2020-05-08 DIAGNOSIS — Z34.83 ENCOUNTER FOR SUPERVISION OF OTHER NORMAL PREGNANCY, THIRD TRIMESTER: Primary | ICD-10-CM

## 2020-05-08 LAB
APPEARANCE UR: CLEAR
BILIRUB UR STRIP-MCNC: NORMAL MG/DL
COLOR UR AUTO: YELLOW
GLUCOSE UR STRIP.AUTO-MCNC: NEGATIVE MG/DL
KETONES UR STRIP.AUTO-MCNC: NEGATIVE MG/DL
LEUKOCYTE ESTERASE UR QL STRIP.AUTO: NORMAL
NITRITE UR QL STRIP.AUTO: NEGATIVE
PH UR STRIP.AUTO: 7 [PH] (ref 5–8)
PROT UR QL STRIP: NEGATIVE MG/DL
RBC UR QL AUTO: NORMAL
SP GR UR STRIP.AUTO: 1.01
UROBILINOGEN UR STRIP-MCNC: NORMAL MG/DL

## 2020-05-08 ASSESSMENT — ENCOUNTER SYMPTOMS
PSYCHIATRIC NEGATIVE: 1
RESPIRATORY NEGATIVE: 1
NEUROLOGICAL NEGATIVE: 1
CARDIOVASCULAR NEGATIVE: 1
MUSCULOSKELETAL NEGATIVE: 1
CONSTITUTIONAL NEGATIVE: 1
EYES NEGATIVE: 1
GASTROINTESTINAL NEGATIVE: 1

## 2020-05-08 ASSESSMENT — FIBROSIS 4 INDEX: FIB4 SCORE: 0.88

## 2020-05-08 NOTE — PROGRESS NOTES
S:  Jackelyn Tobias is a 30 y.o.  who presents for her new OB exam.  She is 31w1d with and NED of Estimated Date of Delivery: 20 based off of LMP . She has no complaints.  She is currently not working, denies heavy lifting or chemical exposure. Denies ER visits or previous care in this pregnancy but did have  here.     Too late for  AFP.  Declines CF.  Denies VB, LOF, or cramping.  Denies dysuria, vaginal DC. Reports normal fetal movement.     Pt is  and lives with FOB and other child.  Pregnancy is unplanned but welcomed.      Discussed diet and exercise during pregnancy. Encouraged good nutrition, and daily exercise including walking or swimming. Discussed expected weight gain during pregnancy. Discussed adequate hydration during pregnancy.  Review of Systems   Constitutional: Negative.    HENT: Negative.    Eyes: Negative.    Respiratory: Negative.    Cardiovascular: Negative.    Gastrointestinal: Negative.    Genitourinary: Negative.    Musculoskeletal: Negative.    Skin: Negative.    Neurological: Negative.    Endo/Heme/Allergies: Negative.    Psychiatric/Behavioral: Negative.    All other systems reviewed and are negative.      History reviewed. No pertinent past medical history.  Family History   Problem Relation Age of Onset   • Diabetes Mother    • Arthritis Mother    • No Known Problems Father    • No Known Problems Brother    • No Known Problems Maternal Grandmother    • Diabetes Maternal Grandfather    • Heart Disease Paternal Grandmother    • Kidney Disease Paternal Grandfather      Social History     Socioeconomic History   • Marital status:      Spouse name: Not on file   • Number of children: Not on file   • Years of education: Not on file   • Highest education level: Not on file   Occupational History   • Not on file   Social Needs   • Financial resource strain: Not on file   • Food insecurity     Worry: Not on file     Inability: Not on file   • Transportation  needs     Medical: Not on file     Non-medical: Not on file   Tobacco Use   • Smoking status: Never Smoker   • Smokeless tobacco: Never Used   Substance and Sexual Activity   • Alcohol use: Never     Frequency: Never   • Drug use: Never   • Sexual activity: Yes     Partners: Male     Comment: None   Lifestyle   • Physical activity     Days per week: Not on file     Minutes per session: Not on file   • Stress: Not on file   Relationships   • Social connections     Talks on phone: Not on file     Gets together: Not on file     Attends Scientologist service: Not on file     Active member of club or organization: Not on file     Attends meetings of clubs or organizations: Not on file     Relationship status: Not on file   • Intimate partner violence     Fear of current or ex partner: Not on file     Emotionally abused: Not on file     Physically abused: Not on file     Forced sexual activity: Not on file   Other Topics Concern   • Not on file   Social History Narrative   • Not on file     OB History    Para Term  AB Living   2 1 1     1   SAB TAB Ectopic Molar Multiple Live Births           0 1      # Outcome Date GA Lbr Gio/2nd Weight Sex Delivery Anes PTL Lv   2 Current            1 Term 18 39w5d / 00:47 3.125 kg (6 lb 14.2 oz) M Vag-Spont None N QUIN       History of Varicella Virus: had as child  History of HSV I or II in self or partner: denies  History of Thyroid problems: denies    O:  Wt 67.8 kg (149 lb 6.4 oz)    See Prenatal Physical.    Wet mount: deferred  Physical Exam   Constitutional: She is oriented to person, place, and time and well-developed, well-nourished, and in no distress.   HENT:   Head: Normocephalic and atraumatic.   Nose: Nose normal.   Eyes: Conjunctivae and EOM are normal.   Neck: Normal range of motion. Neck supple. Thyromegaly present.   Cardiovascular: Normal rate, regular rhythm, normal heart sounds and intact distal pulses.   Pulmonary/Chest: Effort normal and breath  sounds normal.   Abdominal: Soft. Bowel sounds are normal. She exhibits distension.   C/w 31 weeks gestation   Genitourinary:    Vagina and cervix normal.     Musculoskeletal: Normal range of motion.         General: No edema.   Neurological: She is alert and oriented to person, place, and time. Gait normal. GCS score is 15.   Skin: Skin is warm and dry.   Skin tags to back of neck   Psychiatric: Mood, memory, affect and judgment normal.   Nursing note and vitals reviewed.        A:   1.  IUP @ 31w1d per LMP        2.  S=D        3.  See problem list below        4.  Late prenatal care       Patient Active Problem List    Diagnosis Date Noted   • DUB (dysfunctional uterine bleeding) 01/27/2020         P:  1.  GC/CT & pap done        2.  Prenatal labs and TSH  And GTT ordered - lab slip given        3.  Discussed PNV, diet, avoidances and adequate water intake        4.  NOB packet given        5.  Return to office in 2 wks        6.  Complete OB US in ASAP wks        7. Encouraged to establish primary care, given resource for AMY       8. TDAP and kick counts today    Orders Placed This Encounter   • Tdap Vaccine =>6YO IM   • THINPREP RFLX HPV ASCUS W/CTNG   • HEP C VIRUS ANTIBODY   • CBC WITHOUT DIFFERENTIAL   • GLUCOSE 1HR GESTATIONAL   • T.PALLIDUM AB EIA

## 2020-05-08 NOTE — LETTER
Cuente los Movimientos de blandon Bebé  Otro paso importante para la tomeka de blandon bebé    Jackelyn Yao [facility]            Dept: [phone]    ¿Cuántas semanas tiene de embarazo? 31w1d    Fecha cuando tiene que comenzar a contar el movimiento: 5/08/2020                  Akash debe usar hayes diagrama    Fercho manera en que blandon doctor puede controlar a tomeka de blandon bebé es sabiendo cuantas veces se mueve blandon bebé en el útero, o por medio de las “pataditas”.  Usted podrá ayudarle a blandon médico al usar cada día el siguiente diagrama.    Cada día, usted debe prestar atención a cuantas horas le lleva a blandon bebé moverse 10 veces.  Comience a contar en la mañana, lo antes posible después de haberse levantado.    · Primeramente, escriba la hora en que se mueve blandon bebé, hasta llegar a 10 veces.  · Colóquele un check o palomita a cada cuadrito cada vez que blandon bebé se mueva hasta que complete 10 veces.  · Escriba la hora cuando termine de contar 10 veces en la última columna.  · Sume el total del tiempo que le llevó contar los 10 movimientos.  · Finalmente, complete el cuadrito de cuantas horas le llevó hacerlo.    Después de jose juan contado los 10 movimientos, ya no tendrá que contar los demás movimientos por el juan luis del día.  A la mañana siguiente, comience a contar de nuevo cuantas veces se mueve el bebé desde el momento en que se levante.    ¿Qué tendría que considerarse un “movimiento”?  Es difícil de decirlo porque es distinto de fercho madre a otra, y de un embarazo a otro.  Lo importante es que cuente el movimiento de la misma manera debbie el transcurso de blandon embarazo.  Si tiene preguntas adicionales, pregúntele a blandon doctor.    ¡Cuente cuidadosamente cada día!     MUESTRA:  Semana 28    ¿Cuántas horas le ha llevado sentir 10 movimientos?        Hora de Inicio     1     2     3     4     5     6     7     8     9     10   Hora de Finlizar   Sagrario. 8:20 ·  ·  ·  ·  ·  ·  ·  ·  ·  ·  11:40    Mar.               Mié.               Jue.               Vie.               Sáb.               Dom.                 IMPORTANTE:  Usted debe contactar a blandon doctor si le lleva más de 2 horas sentir 10 movimientos de blandon bebé.    Cada mañana, escriba la hora de inicio y comience a contar los movimientos de blandon bebé.  Hágalo colocándole un check o palomita a cada cuadrito cada vez que sienta un movimiento de blandon bebé.  Cuando haya sentido 10 “pataditas”, escriba la hora en que terminó de contar en la última columna.  Luego, complete en la cajita (arriba de la nayeli de check o palomita) el número total de horas que le llevó hacerlo.  Asegúrese de leer completamente las instrucciones en la página anterior.

## 2020-05-08 NOTE — LETTER
Estudios Para Detectar los Portadores de la Fibrosis Quística   (La Enfermedad Fibroquística del Páncreas)    Cassy Inzunza    Esta información esta relacionada con un examen de korina que puede determinar si usted o blandon charline es portador del gen que causa la enfermedad hereditaria que se llama la fibrosis quística.     ¿QUE ES LA ENFERMEDAD FIBROSIS QUÍSTICA?  · La  fibrosis quística es fercho enfermedad hereditaria que afecta a más de 25,000 niños y jóvenes adultos americanos.  · Los síntomas de la fibrosis quística varían de fercho persona a otra.  Los síntomas más comunes son congestión pulmonar, diarrea y retraso en el crecimiento del gabrielle.  La mayoría de las personas con la fibrosis quística padecen de problemas médicos muy severos, y algunas mueren jóvenes.  Otras tienen tan pocos síntomas que no se percatan de que tienen fibrosis quística.  · La fibrosis quística no afecta en absoluto la inteligencia de la persona.  · Aunque actualmente no hay fercho pardeep para la fibrosis quística, los científicos están avanzando con respecto a nuevos tratamientos y en la búsqueda de la pardeep.  Anteriormente la mayoría de las personas que padecían de fibrosis quística morían muy jóvenes.  Hoy en día, muchas personas que padecen de la fibrosis quística sobreviven hasta los 20 o 30 anos de edad.    ¿EXISTE LA POSIBILIDAD DE QUE MI DENNIS PUEDA TENER FIBROSIS QUÍSTICA?  · Usted puede tener un hijo con la fibrosis quística aun cuando no hay nadie en blandon abigail que la padezca.  (Guero la grafica que sigue.)  · Existe fercho prueba que puede ayudarle a determinar si shaista un gen para la fibrosis quística y si por eso corre un riesgo de tener un hijo con la enfermedad.  · Si ambos padres son portadores del gen para la fibrosis quística, hay fercho (1) probabilidad entre 4 (25%) en cada embarazo, de que puedan tener un hijo afectada con fibrosis quística.  · Los portadores del gen para la fibrosis quística tienen fercho copia del gen  normal y el otro gen alterado.  · Las personas con fibrosis quística tienen dos genes alterados para la fibrosis quística,  · Normalmente la mayoría de individuos tienen dos copias normales del gen para fibrosis quística.    Riesgo aproximado de que fercho charline sin familiares con fibrosis quística pueda tener un hijo con fibrosis quística:  Origen / Riesgo  Fercho charline Caucásica (de slime conrado):  1 en 2,500  Fercho charline :  1 en 8,000  Fercho charline Afroamericana (de slime ciera):  1 en 15,000  Fercho charline Asiática:  1 en 32,000    ¿EXISTEN PRUEBAS PARA DETECTAR LOS PORTADORES DE LA FIBROSIS QUÍSTICA?  · Hay fercho prueba de korina para determinar si usted o blandon charline portan el gen para la fibrosis quística.  · Es importante entender que la prueba no detecta todos los portadores del gen para la fibrosis quística.  · Si la prueba determina que ambos padres con portadores, se puede realizar otras pruebas para determinar si blandon futuro ludwin esta afectado.    ¿CUANTO LOVELY LA PRUEBA PARA DETERMINAR SI SOY PORTADOR(A) DEL GEN PARA LA FIBROSIS QUÍSTICA?  · El costo de la prueba varia según blandon póliza de seguro medico y el laboratorio donde se efectúa el análisis.  · El costo promedio de la prueba es de $300.  · Blandon consejera genética puede darle mas información acera de esta prueba para determinar si usted es portador de la fibrosis quística.    _____  Si, yo estoy interesada y me gustaria obtener mas información al respecto.  _____  No tengo interés ni en hacerme la prueba para determinar si soy portador(a) de gen para la fibrosis quística ni en recibir mas información al respecto.    Firma del paciente: _____________________________   5/8/2020

## 2020-05-08 NOTE — PROGRESS NOTES
NOB today  LMP:10/03/2019  Last pap: Pap today in office  Phone # [phone]  Pharmacy confirmed  C/o Pt states no complaints or concerns today. Pt was given Kick Count sheet today.  Tdap today 5/8/20  BTL offered, Pt declined

## 2020-05-11 ENCOUNTER — APPOINTMENT (OUTPATIENT)
Dept: RADIOLOGY | Facility: IMAGING CENTER | Age: 30
End: 2020-05-11
Attending: NURSE PRACTITIONER

## 2020-05-11 DIAGNOSIS — Z34.83 ENCOUNTER FOR SUPERVISION OF OTHER NORMAL PREGNANCY, THIRD TRIMESTER: ICD-10-CM

## 2020-05-11 PROCEDURE — 76805 OB US >/= 14 WKS SNGL FETUS: CPT | Mod: TC | Performed by: OBSTETRICS & GYNECOLOGY

## 2020-05-12 LAB
C TRACH DNA GENITAL QL NAA+PROBE: NEGATIVE
CYTOLOGY REG CYTOL: NORMAL
N GONORRHOEA DNA GENITAL QL NAA+PROBE: NEGATIVE
SPECIMEN SOURCE: NORMAL

## 2020-05-15 ENCOUNTER — HOSPITAL ENCOUNTER (OUTPATIENT)
Dept: LAB | Facility: MEDICAL CENTER | Age: 30
End: 2020-05-15
Attending: NURSE PRACTITIONER
Payer: COMMERCIAL

## 2020-05-15 ENCOUNTER — HOSPITAL ENCOUNTER (OUTPATIENT)
Dept: LAB | Facility: MEDICAL CENTER | Age: 30
End: 2020-05-15
Attending: NURSE PRACTITIONER | Admitting: OBSTETRICS & GYNECOLOGY
Payer: COMMERCIAL

## 2020-05-15 DIAGNOSIS — E04.9 ENLARGED THYROID: ICD-10-CM

## 2020-05-15 DIAGNOSIS — Z32.01 PREGNANCY EXAMINATION OR TEST, POSITIVE RESULT: ICD-10-CM

## 2020-05-15 DIAGNOSIS — Z34.83 ENCOUNTER FOR SUPERVISION OF OTHER NORMAL PREGNANCY, THIRD TRIMESTER: ICD-10-CM

## 2020-05-15 LAB
ABO GROUP BLD: NORMAL
APPEARANCE UR: ABNORMAL
BACTERIA #/AREA URNS HPF: ABNORMAL /HPF
BASOPHILS # BLD AUTO: 0.3 % (ref 0–1.8)
BASOPHILS # BLD: 0.03 K/UL (ref 0–0.12)
BILIRUB UR QL STRIP.AUTO: NEGATIVE
BLD GP AB SCN SERPL QL: NORMAL
COLOR UR: YELLOW
EOSINOPHIL # BLD AUTO: 0.07 K/UL (ref 0–0.51)
EOSINOPHIL NFR BLD: 0.7 % (ref 0–6.9)
EPI CELLS #/AREA URNS HPF: ABNORMAL /HPF
ERYTHROCYTE [DISTWIDTH] IN BLOOD BY AUTOMATED COUNT: 51.7 FL (ref 35.9–50)
ERYTHROCYTE [DISTWIDTH] IN BLOOD BY AUTOMATED COUNT: 51.7 FL (ref 35.9–50)
GLUCOSE UR STRIP.AUTO-MCNC: NEGATIVE MG/DL
HBV SURFACE AG SER QL: ABNORMAL
HCT VFR BLD AUTO: 39.4 % (ref 37–47)
HCT VFR BLD AUTO: 39.9 % (ref 37–47)
HCV AB SER QL: NORMAL
HGB BLD-MCNC: 12.3 G/DL (ref 12–16)
HGB BLD-MCNC: 12.4 G/DL (ref 12–16)
HYALINE CASTS #/AREA URNS LPF: ABNORMAL /LPF
IMM GRANULOCYTES # BLD AUTO: 0.06 K/UL (ref 0–0.11)
IMM GRANULOCYTES NFR BLD AUTO: 0.6 % (ref 0–0.9)
KETONES UR STRIP.AUTO-MCNC: NEGATIVE MG/DL
LEUKOCYTE ESTERASE UR QL STRIP.AUTO: ABNORMAL
LYMPHOCYTES # BLD AUTO: 1.97 K/UL (ref 1–4.8)
LYMPHOCYTES NFR BLD: 19.8 % (ref 22–41)
MCH RBC QN AUTO: 27.4 PG (ref 27–33)
MCH RBC QN AUTO: 27.6 PG (ref 27–33)
MCHC RBC AUTO-ENTMCNC: 31.1 G/DL (ref 33.6–35)
MCHC RBC AUTO-ENTMCNC: 31.2 G/DL (ref 33.6–35)
MCV RBC AUTO: 88.1 FL (ref 81.4–97.8)
MCV RBC AUTO: 88.5 FL (ref 81.4–97.8)
MICRO URNS: ABNORMAL
MONOCYTES # BLD AUTO: 0.62 K/UL (ref 0–0.85)
MONOCYTES NFR BLD AUTO: 6.2 % (ref 0–13.4)
NEUTROPHILS # BLD AUTO: 7.21 K/UL (ref 2–7.15)
NEUTROPHILS NFR BLD: 72.4 % (ref 44–72)
NITRITE UR QL STRIP.AUTO: NEGATIVE
NRBC # BLD AUTO: 0 K/UL
NRBC BLD-RTO: 0 /100 WBC
PH UR STRIP.AUTO: 7 [PH] (ref 5–8)
PLATELET # BLD AUTO: 131 K/UL (ref 164–446)
PLATELET # BLD AUTO: 133 K/UL (ref 164–446)
PMV BLD AUTO: 12.7 FL (ref 9–12.9)
PMV BLD AUTO: 13.4 FL (ref 9–12.9)
PROT UR QL STRIP: 30 MG/DL
RBC # BLD AUTO: 4.45 M/UL (ref 4.2–5.4)
RBC # BLD AUTO: 4.53 M/UL (ref 4.2–5.4)
RBC # URNS HPF: ABNORMAL /HPF
RBC UR QL AUTO: NEGATIVE
RH BLD: NORMAL
RUBV AB SER QL: >500 IU/ML
SP GR UR STRIP.AUTO: 1.02
TREPONEMA PALLIDUM IGG+IGM AB [PRESENCE] IN SERUM OR PLASMA BY IMMUNOASSAY: ABNORMAL
TREPONEMA PALLIDUM IGG+IGM AB [PRESENCE] IN SERUM OR PLASMA BY IMMUNOASSAY: NORMAL
TSH SERPL DL<=0.005 MIU/L-ACNC: 2.57 UIU/ML (ref 0.38–5.33)
UROBILINOGEN UR STRIP.AUTO-MCNC: 0.2 MG/DL
WBC # BLD AUTO: 10 K/UL (ref 4.8–10.8)
WBC # BLD AUTO: 10 K/UL (ref 4.8–10.8)
WBC #/AREA URNS HPF: ABNORMAL /HPF

## 2020-05-15 PROCEDURE — 86780 TREPONEMA PALLIDUM: CPT | Mod: 91

## 2020-05-15 PROCEDURE — 81001 URINALYSIS AUTO W/SCOPE: CPT

## 2020-05-15 PROCEDURE — 85027 COMPLETE CBC AUTOMATED: CPT

## 2020-05-15 PROCEDURE — 36415 COLL VENOUS BLD VENIPUNCTURE: CPT

## 2020-05-15 PROCEDURE — 86850 RBC ANTIBODY SCREEN: CPT

## 2020-05-15 PROCEDURE — 86762 RUBELLA ANTIBODY: CPT

## 2020-05-15 PROCEDURE — 86900 BLOOD TYPING SEROLOGIC ABO: CPT

## 2020-05-15 PROCEDURE — 86901 BLOOD TYPING SEROLOGIC RH(D): CPT

## 2020-05-15 PROCEDURE — 80307 DRUG TEST PRSMV CHEM ANLYZR: CPT

## 2020-05-15 PROCEDURE — 87340 HEPATITIS B SURFACE AG IA: CPT

## 2020-05-15 PROCEDURE — 86803 HEPATITIS C AB TEST: CPT

## 2020-05-15 PROCEDURE — 87389 HIV-1 AG W/HIV-1&-2 AB AG IA: CPT

## 2020-05-15 PROCEDURE — 86780 TREPONEMA PALLIDUM: CPT

## 2020-05-15 PROCEDURE — 85025 COMPLETE CBC W/AUTO DIFF WBC: CPT

## 2020-05-15 PROCEDURE — 84443 ASSAY THYROID STIM HORMONE: CPT

## 2020-05-16 PROBLEM — D69.6 THROMBOCYTOPENIA (HCC): Status: ACTIVE | Noted: 2020-05-16

## 2020-05-17 DIAGNOSIS — O28.3 ABNORMAL FETAL ULTRASOUND: ICD-10-CM

## 2020-05-17 LAB
AMPHET CTO UR CFM-MCNC: NEGATIVE NG/ML
BARBITURATES CTO UR CFM-MCNC: NEGATIVE NG/ML
BENZODIAZ CTO UR CFM-MCNC: NEGATIVE NG/ML
CANNABINOIDS CTO UR CFM-MCNC: NEGATIVE NG/ML
COCAINE CTO UR CFM-MCNC: NEGATIVE NG/ML
DRUG COMMENT 753798: NORMAL
METHADONE CTO UR CFM-MCNC: NEGATIVE NG/ML
OPIATES CTO UR CFM-MCNC: NEGATIVE NG/ML
PCP CTO UR CFM-MCNC: NEGATIVE NG/ML
PROPOXYPH CTO UR CFM-MCNC: NEGATIVE NG/ML

## 2020-05-22 ENCOUNTER — HOSPITAL ENCOUNTER (OUTPATIENT)
Dept: LAB | Facility: MEDICAL CENTER | Age: 30
End: 2020-05-22
Attending: NURSE PRACTITIONER
Payer: COMMERCIAL

## 2020-05-22 ENCOUNTER — ROUTINE PRENATAL (OUTPATIENT)
Dept: OBGYN | Facility: CLINIC | Age: 30
End: 2020-05-22

## 2020-05-22 VITALS — SYSTOLIC BLOOD PRESSURE: 118 MMHG | WEIGHT: 184 LBS | BODY MASS INDEX: 30.62 KG/M2 | DIASTOLIC BLOOD PRESSURE: 68 MMHG

## 2020-05-22 DIAGNOSIS — O09.33 LATE PRENATAL CARE AFFECTING PREGNANCY IN THIRD TRIMESTER: ICD-10-CM

## 2020-05-22 DIAGNOSIS — Z34.83 ENCOUNTER FOR SUPERVISION OF OTHER NORMAL PREGNANCY, THIRD TRIMESTER: ICD-10-CM

## 2020-05-22 DIAGNOSIS — R73.09 ABNORMAL GTT (GLUCOSE TOLERANCE TEST): ICD-10-CM

## 2020-05-22 LAB — GLUCOSE 1H P 50 G GLC PO SERPL-MCNC: 144 MG/DL (ref 70–139)

## 2020-05-22 PROCEDURE — 90040 PR PRENATAL FOLLOW UP: CPT | Performed by: NURSE PRACTITIONER

## 2020-05-22 ASSESSMENT — FIBROSIS 4 INDEX: FIB4 SCORE: 0.96

## 2020-05-22 NOTE — PROGRESS NOTES
Pt here today for OB follow up  Pt states no complaints   Reports +FM  Good # [phone]  Pharmacy Confirmed.  Chaperone offered and not indicated.   Pt states she will do labs today after OB appt.

## 2020-06-04 ENCOUNTER — DATING (OUTPATIENT)
Dept: OBGYN | Facility: CLINIC | Age: 30
End: 2020-06-04

## 2020-06-05 ENCOUNTER — ROUTINE PRENATAL (OUTPATIENT)
Dept: OBGYN | Facility: CLINIC | Age: 30
End: 2020-06-05

## 2020-06-05 VITALS — WEIGHT: 184 LBS | DIASTOLIC BLOOD PRESSURE: 66 MMHG | SYSTOLIC BLOOD PRESSURE: 118 MMHG | BODY MASS INDEX: 30.62 KG/M2

## 2020-06-05 DIAGNOSIS — Z34.83 SUPERVISION OF NORMAL INTRAUTERINE PREGNANCY IN MULTIGRAVIDA, THIRD TRIMESTER: ICD-10-CM

## 2020-06-05 PROCEDURE — 90040 PR PRENATAL FOLLOW UP: CPT | Performed by: PHYSICIAN ASSISTANT

## 2020-06-05 ASSESSMENT — FIBROSIS 4 INDEX: FIB4 SCORE: 0.96

## 2020-06-05 NOTE — NON-PROVIDER
OB follow up   + fetal movement.  No VB, LOF or UC's.  Wt: 184lb      BP: 118/66  Phone # [phone]  Preferred pharmacy confirmed.  C/o some lower back pain  Patient notified about abnormal 1 GTT and need for 3 GTT, instructions for test given to patient: fasting from 10:00pm night before test ( plain water is ok), call laboratory to schedule appt. Bring something to eat for after test is done. Aware she needs to stay there for the 3 hrs. she agrees to do it ASAP.

## 2020-06-05 NOTE — PROGRESS NOTES
Pt has no complaints with cramping, UCs, Vb, LOF. +FM. GBS next visit. Pt urged to do 3hr GTT asap, as 1hr GTT elevated 144. Platelet stable. PTL precautions reviewed. Pt mentions she has no more f/u appt with Dr Celis, he recommends PP fetal heart US only. Daily FKC recommended. RTC 1 wk or sooner prn.

## 2020-06-09 ENCOUNTER — HOSPITAL ENCOUNTER (OUTPATIENT)
Dept: LAB | Facility: MEDICAL CENTER | Age: 30
End: 2020-06-09
Attending: NURSE PRACTITIONER
Payer: COMMERCIAL

## 2020-06-09 DIAGNOSIS — R73.09 ABNORMAL GTT (GLUCOSE TOLERANCE TEST): ICD-10-CM

## 2020-06-10 LAB
GLUCOSE 1H P CHAL SERPL-MCNC: 156 MG/DL (ref 65–180)
GLUCOSE 2H P CHAL SERPL-MCNC: 165 MG/DL (ref 65–155)
GLUCOSE 3H P CHAL SERPL-MCNC: 105 MG/DL (ref 65–140)
GLUCOSE BS SERPL-MCNC: 83 MG/DL (ref 65–95)

## 2020-06-12 ENCOUNTER — HOSPITAL ENCOUNTER (OUTPATIENT)
Facility: MEDICAL CENTER | Age: 30
End: 2020-06-12
Attending: NURSE PRACTITIONER
Payer: COMMERCIAL

## 2020-06-12 ENCOUNTER — ROUTINE PRENATAL (OUTPATIENT)
Dept: OBGYN | Facility: CLINIC | Age: 30
End: 2020-06-12

## 2020-06-12 VITALS — WEIGHT: 187 LBS | BODY MASS INDEX: 31.12 KG/M2 | DIASTOLIC BLOOD PRESSURE: 94 MMHG | SYSTOLIC BLOOD PRESSURE: 140 MMHG

## 2020-06-12 DIAGNOSIS — Z34.83 SUPERVISION OF NORMAL INTRAUTERINE PREGNANCY IN MULTIGRAVIDA, THIRD TRIMESTER: Primary | ICD-10-CM

## 2020-06-12 DIAGNOSIS — O16.3 ELEVATED BLOOD PRESSURE COMPLICATING PREGNANCY IN THIRD TRIMESTER, ANTEPARTUM: ICD-10-CM

## 2020-06-12 LAB
APPEARANCE UR: NORMAL
BILIRUB UR STRIP-MCNC: NORMAL MG/DL
COLOR UR AUTO: NORMAL
GLUCOSE UR STRIP.AUTO-MCNC: NEGATIVE MG/DL
KETONES UR STRIP.AUTO-MCNC: NEGATIVE MG/DL
LEUKOCYTE ESTERASE UR QL STRIP.AUTO: NORMAL
NITRITE UR QL STRIP.AUTO: NEGATIVE
PH UR STRIP.AUTO: 7 [PH] (ref 5–8)
PROT UR QL STRIP: 30 MG/DL
RBC UR QL AUTO: NORMAL
SP GR UR STRIP.AUTO: 1.02
UROBILINOGEN UR STRIP-MCNC: NORMAL MG/DL

## 2020-06-12 PROCEDURE — 81002 URINALYSIS NONAUTO W/O SCOPE: CPT | Performed by: NURSE PRACTITIONER

## 2020-06-12 PROCEDURE — 90040 PR PRENATAL FOLLOW UP: CPT | Performed by: NURSE PRACTITIONER

## 2020-06-12 ASSESSMENT — FIBROSIS 4 INDEX: FIB4 SCORE: 0.96

## 2020-06-12 NOTE — PROGRESS NOTES
S) Pt is a 30 y.o.   at 36w1d  gestation. Routine prenatal care today. Pts BP elevated today.  Pt denies any headache, visual changes, epigastic pain.    Fetal movement Normal  Cramping no  VB no  LOF no   Denies dysuria. Generally feels well today. Good self-care activities identified. Denies headaches, swelling, visual changes, or epigastric pain .     O) See flow sheet for vital signs and fetal measurements.          Labs:       PNL: WNL       GCT: elevated 1 hr, normal 3 hr       AFP: Not Examined       GBS: N/A       Pertinent ultrasound - 20 AFO, prominent cisterna magna       Saw Dr Celis, AFO present and recommends fetal echo     +1 proteinuria  A) IUP at 36w1d       S=D         Patient Active Problem List    Diagnosis Date Noted   • Supervision of normal intrauterine pregnancy in multigravida, third trimester 2020   • abnormal 1 hr normal 3 2020   • Abnormal fetal US - AFO and prominent cisterna magna - Needs PP fetal heart US, no f/u 2020   • Thrombocytopenia - 131, stable 2020   • Late prenatal care affecting pregnancy in third trimester 2020          SVE: deferred         TDAP: yes       FLU: no        BTL: no       : no       C/S Consent: no       IOL or C/S scheduled: no       LAST PAP: 20 negative         P) s/s ptl vs general discomforts. Fetal movements reviewed. General ed and anticipatory guidance. Nutrition/exercise/vitamin. Plans breast Plans pp contraception- unsure  Continue PNV.   Sent to L&D for pre eclampsia workup.  [facility] resident notified.    RTC 1 week if discharged.

## 2020-06-12 NOTE — PROGRESS NOTES
OB follow up   + fetal movement.  No VB, LOF or UC's.  Dr. Celis seen 6/1/20. Report in media. No follow ups  Phone # [phone]  Preferred pharmacy confirmed.  GBS today

## 2020-06-14 PROBLEM — O99.820 GBS (GROUP B STREPTOCOCCUS CARRIER), +RV CULTURE, CURRENTLY PREGNANT: Status: ACTIVE | Noted: 2020-06-14

## 2020-06-14 LAB — GP B STREP DNA SPEC QL NAA+PROBE: POSITIVE

## 2020-06-19 ENCOUNTER — HOSPITAL ENCOUNTER (INPATIENT)
Facility: MEDICAL CENTER | Age: 30
LOS: 2 days | End: 2020-06-21
Attending: OBSTETRICS & GYNECOLOGY | Admitting: OBSTETRICS & GYNECOLOGY
Payer: COMMERCIAL

## 2020-06-19 ENCOUNTER — ROUTINE PRENATAL (OUTPATIENT)
Dept: OBGYN | Facility: CLINIC | Age: 30
End: 2020-06-19

## 2020-06-19 VITALS — WEIGHT: 185 LBS | SYSTOLIC BLOOD PRESSURE: 130 MMHG | DIASTOLIC BLOOD PRESSURE: 80 MMHG | BODY MASS INDEX: 30.79 KG/M2

## 2020-06-19 DIAGNOSIS — O99.820 GBS (GROUP B STREPTOCOCCUS CARRIER), +RV CULTURE, CURRENTLY PREGNANT: ICD-10-CM

## 2020-06-19 DIAGNOSIS — O16.3 ELEVATED BLOOD PRESSURE AFFECTING PREGNANCY IN THIRD TRIMESTER, ANTEPARTUM: ICD-10-CM

## 2020-06-19 DIAGNOSIS — Z34.83 SUPERVISION OF NORMAL INTRAUTERINE PREGNANCY IN MULTIGRAVIDA, THIRD TRIMESTER: Primary | ICD-10-CM

## 2020-06-19 LAB
ALBUMIN SERPL BCP-MCNC: 3.4 G/DL (ref 3.2–4.9)
ALBUMIN/GLOB SERPL: 1.1 G/DL
ALP SERPL-CCNC: 189 U/L (ref 30–99)
ALT SERPL-CCNC: 13 U/L (ref 2–50)
ANION GAP SERPL CALC-SCNC: 13 MMOL/L (ref 7–16)
AST SERPL-CCNC: 14 U/L (ref 12–45)
BASOPHILS # BLD AUTO: 0.3 % (ref 0–1.8)
BASOPHILS # BLD: 0.02 K/UL (ref 0–0.12)
BILIRUB SERPL-MCNC: 0.7 MG/DL (ref 0.1–1.5)
BUN SERPL-MCNC: 8 MG/DL (ref 8–22)
CALCIUM SERPL-MCNC: 9.6 MG/DL (ref 8.5–10.5)
CHLORIDE SERPL-SCNC: 100 MMOL/L (ref 96–112)
CO2 SERPL-SCNC: 17 MMOL/L (ref 20–33)
CREAT SERPL-MCNC: 0.61 MG/DL (ref 0.5–1.4)
CREAT UR-MCNC: 131.45 MG/DL
EOSINOPHIL # BLD AUTO: 0.04 K/UL (ref 0–0.51)
EOSINOPHIL NFR BLD: 0.5 % (ref 0–6.9)
ERYTHROCYTE [DISTWIDTH] IN BLOOD BY AUTOMATED COUNT: 51 FL (ref 35.9–50)
GLOBULIN SER CALC-MCNC: 3.1 G/DL (ref 1.9–3.5)
GLUCOSE SERPL-MCNC: 138 MG/DL (ref 65–99)
HCT VFR BLD AUTO: 41.7 % (ref 37–47)
HGB BLD-MCNC: 13.7 G/DL (ref 12–16)
HOLDING TUBE BB 8507: NORMAL
IMM GRANULOCYTES # BLD AUTO: 0.05 K/UL (ref 0–0.11)
IMM GRANULOCYTES NFR BLD AUTO: 0.7 % (ref 0–0.9)
LYMPHOCYTES # BLD AUTO: 1.73 K/UL (ref 1–4.8)
LYMPHOCYTES NFR BLD: 22.7 % (ref 22–41)
MCH RBC QN AUTO: 29.1 PG (ref 27–33)
MCHC RBC AUTO-ENTMCNC: 32.9 G/DL (ref 33.6–35)
MCV RBC AUTO: 88.5 FL (ref 81.4–97.8)
MONOCYTES # BLD AUTO: 0.46 K/UL (ref 0–0.85)
MONOCYTES NFR BLD AUTO: 6 % (ref 0–13.4)
NEUTROPHILS # BLD AUTO: 5.32 K/UL (ref 2–7.15)
NEUTROPHILS NFR BLD: 69.8 % (ref 44–72)
NRBC # BLD AUTO: 0 K/UL
NRBC BLD-RTO: 0 /100 WBC
PLATELET # BLD AUTO: 135 K/UL (ref 164–446)
PMV BLD AUTO: 13 FL (ref 9–12.9)
POTASSIUM SERPL-SCNC: 3.6 MMOL/L (ref 3.6–5.5)
PROT SERPL-MCNC: 6.5 G/DL (ref 6–8.2)
PROT UR-MCNC: 17 MG/DL (ref 0–15)
PROT/CREAT UR: 129 MG/G (ref 10–107)
RBC # BLD AUTO: 4.71 M/UL (ref 4.2–5.4)
SODIUM SERPL-SCNC: 130 MMOL/L (ref 135–145)
WBC # BLD AUTO: 7.6 K/UL (ref 4.8–10.8)

## 2020-06-19 PROCEDURE — 36415 COLL VENOUS BLD VENIPUNCTURE: CPT

## 2020-06-19 PROCEDURE — 84156 ASSAY OF PROTEIN URINE: CPT

## 2020-06-19 PROCEDURE — 700105 HCHG RX REV CODE 258: Performed by: OBSTETRICS & GYNECOLOGY

## 2020-06-19 PROCEDURE — 80053 COMPREHEN METABOLIC PANEL: CPT

## 2020-06-19 PROCEDURE — 700111 HCHG RX REV CODE 636 W/ 250 OVERRIDE (IP): Performed by: OBSTETRICS & GYNECOLOGY

## 2020-06-19 PROCEDURE — 700105 HCHG RX REV CODE 258

## 2020-06-19 PROCEDURE — 82570 ASSAY OF URINE CREATININE: CPT

## 2020-06-19 PROCEDURE — 85025 COMPLETE CBC W/AUTO DIFF WBC: CPT

## 2020-06-19 PROCEDURE — 90040 PR PRENATAL FOLLOW UP: CPT | Performed by: NURSE PRACTITIONER

## 2020-06-19 PROCEDURE — 770002 HCHG ROOM/CARE - OB PRIVATE (112)

## 2020-06-19 RX ORDER — SODIUM CHLORIDE, SODIUM LACTATE, POTASSIUM CHLORIDE, CALCIUM CHLORIDE 600; 310; 30; 20 MG/100ML; MG/100ML; MG/100ML; MG/100ML
INJECTION, SOLUTION INTRAVENOUS CONTINUOUS
Status: DISPENSED | OUTPATIENT
Start: 2020-06-19 | End: 2020-06-20

## 2020-06-19 RX ORDER — SODIUM CHLORIDE 9 MG/ML
INJECTION, SOLUTION INTRAVENOUS
Status: COMPLETED
Start: 2020-06-19 | End: 2020-06-20

## 2020-06-19 RX ORDER — MISOPROSTOL 200 UG/1
800 TABLET ORAL
Status: DISCONTINUED | OUTPATIENT
Start: 2020-06-19 | End: 2020-06-20 | Stop reason: HOSPADM

## 2020-06-19 RX ORDER — DEXTROSE, SODIUM CHLORIDE, SODIUM LACTATE, POTASSIUM CHLORIDE, AND CALCIUM CHLORIDE 5; .6; .31; .03; .02 G/100ML; G/100ML; G/100ML; G/100ML; G/100ML
INJECTION, SOLUTION INTRAVENOUS CONTINUOUS
Status: DISCONTINUED | OUTPATIENT
Start: 2020-06-20 | End: 2020-06-20 | Stop reason: HOSPADM

## 2020-06-19 RX ORDER — ALUMINA, MAGNESIA, AND SIMETHICONE 2400; 2400; 240 MG/30ML; MG/30ML; MG/30ML
30 SUSPENSION ORAL EVERY 6 HOURS PRN
Status: DISCONTINUED | OUTPATIENT
Start: 2020-06-19 | End: 2020-06-20 | Stop reason: HOSPADM

## 2020-06-19 RX ADMIN — OXYTOCIN 1 MILLI-UNITS/MIN: 10 INJECTION, SOLUTION INTRAMUSCULAR; INTRAVENOUS at 19:40

## 2020-06-19 RX ADMIN — SODIUM CHLORIDE: 900 INJECTION INTRAVENOUS at 19:30

## 2020-06-19 RX ADMIN — SODIUM CHLORIDE 5 MILLION UNITS: 900 INJECTION INTRAVENOUS at 19:25

## 2020-06-19 RX ADMIN — SODIUM CHLORIDE, POTASSIUM CHLORIDE, SODIUM LACTATE AND CALCIUM CHLORIDE: 600; 310; 30; 20 INJECTION, SOLUTION INTRAVENOUS at 19:27

## 2020-06-19 ASSESSMENT — LIFESTYLE VARIABLES
HAVE YOU EVER FELT YOU SHOULD CUT DOWN ON YOUR DRINKING: NO
EVER_SMOKED: NEVER
CONSUMPTION TOTAL: INCOMPLETE
HAVE PEOPLE ANNOYED YOU BY CRITICIZING YOUR DRINKING: NO
ALCOHOL_USE: NO
EVER FELT BAD OR GUILTY ABOUT YOUR DRINKING: NO
EVER HAD A DRINK FIRST THING IN THE MORNING TO STEADY YOUR NERVES TO GET RID OF A HANGOVER: NO
TOTAL SCORE: 0

## 2020-06-19 ASSESSMENT — FIBROSIS 4 INDEX
FIB4 SCORE: 0.96
FIB4 SCORE: 0.96

## 2020-06-19 ASSESSMENT — PATIENT HEALTH QUESTIONNAIRE - PHQ9
1. LITTLE INTEREST OR PLEASURE IN DOING THINGS: NOT AT ALL
2. FEELING DOWN, DEPRESSED, IRRITABLE, OR HOPELESS: NOT AT ALL
SUM OF ALL RESPONSES TO PHQ9 QUESTIONS 1 AND 2: 0

## 2020-06-19 NOTE — PROGRESS NOTES
S:  Pt is  at 37w1d here for routine OB follow up.  No c/o, reports some epigastric pain.  Reports good FM.  Denies VB, LOF, RUCs, or vaginal DC. Denies cough, SOB, sore throat or fever.  Denies exposure to anyone with COVID 19.  Was seen in clinic last week with elevated BPs and did not go to hospital as recommended by provider.    O:  Please see above vitals.        FHTs: 140        Fundal ht: 36        Fetal position: vertex        SVE: deferred        GBS positive on 20 -- reviewed w pt.      A:  IUP at 37w1d  Patient Active Problem List    Diagnosis Date Noted   • GBS (group B Streptococcus carrier), +RV culture, currently pregnant 2020   • Supervision of normal intrauterine pregnancy in multigravida, third trimester 2020   • abnormal 1 hr normal 3 2020   • Abnormal fetal US - AFO and prominent cisterna magna - Needs PP fetal heart US, no f/u 2020   • Thrombocytopenia - 131, stable 2020   • Late prenatal care affecting pregnancy in third trimester 2020       P:  1.  To L&D for PIH work up. Report called to resident.

## 2020-06-19 NOTE — PROGRESS NOTES
OB follow up   + fetal movement. Active   No VB, LOF or UC's.  Wt:185.0LBS       BP:130/80  Phone # [phone]  Preferred pharmacy confirmed.  GBS Positive    06/1/2020 no follow ups

## 2020-06-20 LAB
ERYTHROCYTE [DISTWIDTH] IN BLOOD BY AUTOMATED COUNT: 50.7 FL (ref 35.9–50)
HCT VFR BLD AUTO: 33.5 % (ref 37–47)
HGB BLD-MCNC: 11 G/DL (ref 12–16)
MCH RBC QN AUTO: 28.9 PG (ref 27–33)
MCHC RBC AUTO-ENTMCNC: 32.8 G/DL (ref 33.6–35)
MCV RBC AUTO: 88.2 FL (ref 81.4–97.8)
PLATELET # BLD AUTO: 114 K/UL (ref 164–446)
PMV BLD AUTO: 12.5 FL (ref 9–12.9)
RBC # BLD AUTO: 3.8 M/UL (ref 4.2–5.4)
WBC # BLD AUTO: 10.1 K/UL (ref 4.8–10.8)

## 2020-06-20 PROCEDURE — 700102 HCHG RX REV CODE 250 W/ 637 OVERRIDE(OP): Performed by: NURSE PRACTITIONER

## 2020-06-20 PROCEDURE — 700111 HCHG RX REV CODE 636 W/ 250 OVERRIDE (IP): Performed by: OBSTETRICS & GYNECOLOGY

## 2020-06-20 PROCEDURE — 36415 COLL VENOUS BLD VENIPUNCTURE: CPT

## 2020-06-20 PROCEDURE — A9270 NON-COVERED ITEM OR SERVICE: HCPCS | Performed by: OBSTETRICS & GYNECOLOGY

## 2020-06-20 PROCEDURE — 700105 HCHG RX REV CODE 258: Performed by: OBSTETRICS & GYNECOLOGY

## 2020-06-20 PROCEDURE — A9270 NON-COVERED ITEM OR SERVICE: HCPCS | Performed by: NURSE PRACTITIONER

## 2020-06-20 PROCEDURE — 59409 OBSTETRICAL CARE: CPT | Performed by: NURSE PRACTITIONER

## 2020-06-20 PROCEDURE — 10907ZC DRAINAGE OF AMNIOTIC FLUID, THERAPEUTIC FROM PRODUCTS OF CONCEPTION, VIA NATURAL OR ARTIFICIAL OPENING: ICD-10-PCS | Performed by: OBSTETRICS & GYNECOLOGY

## 2020-06-20 PROCEDURE — 770002 HCHG ROOM/CARE - OB PRIVATE (112)

## 2020-06-20 PROCEDURE — 59409 OBSTETRICAL CARE: CPT

## 2020-06-20 PROCEDURE — 304965 HCHG RECOVERY SERVICES

## 2020-06-20 PROCEDURE — 85027 COMPLETE CBC AUTOMATED: CPT

## 2020-06-20 PROCEDURE — 700102 HCHG RX REV CODE 250 W/ 637 OVERRIDE(OP): Performed by: OBSTETRICS & GYNECOLOGY

## 2020-06-20 PROCEDURE — 3E033VJ INTRODUCTION OF OTHER HORMONE INTO PERIPHERAL VEIN, PERCUTANEOUS APPROACH: ICD-10-PCS | Performed by: OBSTETRICS & GYNECOLOGY

## 2020-06-20 RX ORDER — ACETAMINOPHEN 325 MG/1
325 TABLET ORAL EVERY 4 HOURS PRN
Status: DISCONTINUED | OUTPATIENT
Start: 2020-06-20 | End: 2020-06-21 | Stop reason: HOSPADM

## 2020-06-20 RX ORDER — MISOPROSTOL 200 UG/1
600 TABLET ORAL
Status: DISCONTINUED | OUTPATIENT
Start: 2020-06-20 | End: 2020-06-21 | Stop reason: HOSPADM

## 2020-06-20 RX ORDER — SODIUM CHLORIDE, SODIUM LACTATE, POTASSIUM CHLORIDE, CALCIUM CHLORIDE 600; 310; 30; 20 MG/100ML; MG/100ML; MG/100ML; MG/100ML
INJECTION, SOLUTION INTRAVENOUS PRN
Status: DISCONTINUED | OUTPATIENT
Start: 2020-06-20 | End: 2020-06-21 | Stop reason: HOSPADM

## 2020-06-20 RX ORDER — BISACODYL 10 MG
10 SUPPOSITORY, RECTAL RECTAL PRN
Status: DISCONTINUED | OUTPATIENT
Start: 2020-06-20 | End: 2020-06-21 | Stop reason: HOSPADM

## 2020-06-20 RX ORDER — ACETAMINOPHEN 325 MG/1
650 TABLET ORAL EVERY 4 HOURS PRN
Status: DISCONTINUED | OUTPATIENT
Start: 2020-06-20 | End: 2020-06-21 | Stop reason: HOSPADM

## 2020-06-20 RX ORDER — DOCUSATE SODIUM 100 MG/1
100 CAPSULE, LIQUID FILLED ORAL 2 TIMES DAILY PRN
Status: DISCONTINUED | OUTPATIENT
Start: 2020-06-20 | End: 2020-06-21 | Stop reason: HOSPADM

## 2020-06-20 RX ORDER — IBUPROFEN 800 MG/1
800 TABLET ORAL EVERY 8 HOURS PRN
Status: DISCONTINUED | OUTPATIENT
Start: 2020-06-20 | End: 2020-06-21 | Stop reason: HOSPADM

## 2020-06-20 RX ORDER — VITAMIN A ACETATE, BETA CAROTENE, ASCORBIC ACID, CHOLECALCIFEROL, .ALPHA.-TOCOPHEROL ACETATE, DL-, THIAMINE MONONITRATE, RIBOFLAVIN, NIACINAMIDE, PYRIDOXINE HYDROCHLORIDE, FOLIC ACID, CYANOCOBALAMIN, CALCIUM CARBONATE, FERROUS FUMARATE, ZINC OXIDE, CUPRIC OXIDE 3080; 12; 120; 400; 1; 1.84; 3; 20; 22; 920; 25; 200; 27; 10; 2 [IU]/1; UG/1; MG/1; [IU]/1; MG/1; MG/1; MG/1; MG/1; MG/1; [IU]/1; MG/1; MG/1; MG/1; MG/1; MG/1
1 TABLET, FILM COATED ORAL EVERY MORNING
Status: DISCONTINUED | OUTPATIENT
Start: 2020-06-20 | End: 2020-06-21 | Stop reason: HOSPADM

## 2020-06-20 RX ADMIN — IBUPROFEN 800 MG: 800 TABLET, FILM COATED ORAL at 12:52

## 2020-06-20 RX ADMIN — PRENATAL WITH FERROUS FUM AND FOLIC ACID 1 TABLET: 3080; 920; 120; 400; 22; 1.84; 3; 20; 10; 1; 12; 200; 27; 25; 2 TABLET ORAL at 11:07

## 2020-06-20 RX ADMIN — OXYTOCIN 41.67 MILLI-UNITS/MIN: 10 INJECTION, SOLUTION INTRAMUSCULAR; INTRAVENOUS at 06:04

## 2020-06-20 RX ADMIN — FENTANYL CITRATE 100 MCG: 50 INJECTION INTRAMUSCULAR; INTRAVENOUS at 04:17

## 2020-06-20 RX ADMIN — SODIUM CHLORIDE 2.5 MILLION UNITS: 9 INJECTION, SOLUTION INTRAVENOUS at 00:50

## 2020-06-20 ASSESSMENT — EDINBURGH POSTNATAL DEPRESSION SCALE (EPDS)
I HAVE FELT SAD OR MISERABLE: NO, NOT AT ALL
I HAVE LOOKED FORWARD WITH ENJOYMENT TO THINGS: AS MUCH AS I EVER DID
THINGS HAVE BEEN GETTING ON TOP OF ME: NO, I HAVE BEEN COPING AS WELL AS EVER
I HAVE BEEN ABLE TO LAUGH AND SEE THE FUNNY SIDE OF THINGS: AS MUCH AS I ALWAYS COULD
THE THOUGHT OF HARMING MYSELF HAS OCCURRED TO ME: NEVER
I HAVE BLAMED MYSELF UNNECESSARILY WHEN THINGS WENT WRONG: NOT VERY OFTEN
I HAVE FELT SCARED OR PANICKY FOR NO GOOD REASON: NO, NOT AT ALL
I HAVE BEEN ANXIOUS OR WORRIED FOR NO GOOD REASON: NO, NOT AT ALL
I HAVE BEEN SO UNHAPPY THAT I HAVE HAD DIFFICULTY SLEEPING: NOT AT ALL
I HAVE BEEN SO UNHAPPY THAT I HAVE BEEN CRYING: NO, NEVER

## 2020-06-20 NOTE — L&D DELIVERY NOTE
Delivery Note    PATIENT ID:  NAME:  Jackelyn Simmons  MRN:               [id number]  YOB: 1990      Labor Course: Pt admitted for IOl secondary to gHTN. Progressed with pitocin and AROM to complete and pushing.     On 2020  at 05:06, this 30 y.o.,  37w2d now  , GBS positive female delivered via  under no anesthesia a viable male infant weight pending with APGAR scores of 8 and 8 at one and five minutes. One tight nuchal cord delivered through with somersault maneuver.  Baby to maternal abdomen.  Spontaneous cry.  Mouth and nares bulb suctioned by RN. Delayed cord clamping occurred with cord doubly clamped by myself and cut by myself.  Spontaneous delivery of  placenta grossly intact at 05:18.  CVx3. Pitocin infusing in IVF.  FF and bleeding light.  Upon vaginal exam, there was no lacerations. Pt and infant are stable and bonding.  Estimated blood loss: 400.      MERVIN Grace Dr., Attending Physician

## 2020-06-20 NOTE — H&P
History and Physical    Jackelyn Tobias is a 30 y.o. female  -Para:     Gestational Age:  37w1d  Admitted for:   Induction of Labor due to PIH  Admitted to  [facility].  [facility]    HPI: Patient is a 31 yo  at 37w1d based on MP consistent with 33 week US sent here from clinic for elevated BP's. Patient denies headaches, visual changes, leg swelling or abdominal pain. She denies contractions or loss of fluid. States her last pregnancy and delivery were uncomplicated.    Pregnancy has been complicated by AFO on US and saw Dr. Celis who recommended a fetal echo. Also complicated by late prenatal care starting in the third trimester.    She states:   Loss of fluid:   negative  Abdominal Pain:  negative  Uterine Contractions:  negative  Vaginal Bleeding:  negative  Fetal Movement:  normal  Patient denies fever, chills, nausea, vomiting , headache, visual disturbance, or dysuria  Patient's last menstrual period was 10/03/2019 (exact date).  Estimated Date of Delivery: 20  Final NED: 2020, by Last Menstrual Period    Patient Active Problem List    Diagnosis Date Noted   • Elevated blood pressure affecting pregnancy in third trimester, antepartum 2020   • GBS (group B Streptococcus carrier), +RV culture, currently pregnant 2020   • Supervision of normal intrauterine pregnancy in multigravida, third trimester 2020   • abnormal 1 hr normal 3 2020   • Abnormal fetal US - AFO and prominent cisterna magna - Needs PP fetal heart US, no f/u 2020   • Thrombocytopenia - 131, stable 2020   • Late prenatal care affecting pregnancy in third trimester 2020       Admitting DX: Delivery  Pregnancy Complications:  AFO on US, followed up with Dr. Celis, GBS +  OB Risk Factors:   PIH  Labor State:    Early latent labor.    History:  Past medical Hx:  Denies medical problems. Denies medication use.    Gyn  "Hx:  Patient is a . She states her last pregnancy was 2 years ago as a full term vaginal delivery with no problems with an infant weighing 7lbs 14oz. She reports regular periods prior to pregnancy and no abnormal pap smears.    Surgical Hx:  Denies past surgeries.    Social Hx:   Denies tobacco use, alcohol use, and drug use.     OB History    Para Term  AB Living   2 1 1     1   SAB TAB Ectopic Molar Multiple Live Births           0 1      # Outcome Date GA Lbr Gio/2nd Weight Sex Delivery Anes PTL Lv   2 Current            1 Term 18 39w5d / 00:47 3.125 kg (6 lb 14.2 oz) M Vag-Spont None N QUIN       Medications:  No current facility-administered medications on file prior to encounter.      Current Outpatient Medications on File Prior to Encounter   Medication Sig Dispense Refill   • Prenatal Multivit-Min-Fe-FA (PRE-ANGELLA FORMULA PO) Take  by mouth every day.         Allergies:  Patient has no known allergies.    ROS:   Neuro: negative for headache    Cardiovascular: negative for chest pain  Gastro intestinal: negative for vomiting  Genitourinary: negative for painful urination            Physical Exam:  Ht 1.651 m (5' 5\")   Wt 83.9 kg (185 lb)   LMP 10/03/2019 (Exact Date)   BMI 30.79 kg/m²   Constitutional: healthy-appearing, Well-developed, well-nourished  HEENT: EOMI, moist mucus membranes  Cardio: regular rate and rhythm, S1, S2 normal, no murmur, click, rub or gallop  Lung: unlabored respirations, no intercostal retractions or accessory muscle use  Abdomen: abdomen is soft without significant tenderness, gravid  Extremity: no edema, redness or tenderness in the calves or thighs    Cervical Exam: 50%  Cervix Dilatation: 3  Station: high  Pelvis: Normal  Fetal Assessment: Fetal heart variability: moderate  Fetal Heart Rate decelerations: none  Fetal Heart Rate accelerations: yes  Baseline FHR: 130 per minute  Uterine contractions: irregular, every 8-10 minutes  Estimated Fetal " Weight: 3500 - 4000g      Labs:      Prenatal Results     General (Most Recent Result)     Test Value Date Time    ABO O  05/15/20 1130    Rh POS  05/15/20 1130    Antibody screen NEG  05/15/20 1130    HbA1c       Gonorrhea Negative  05/08/20 1014    Chlamydia  by PCR Negative  05/08/20 1014    Chlamydia by DNA       RPR/Syphilus Non-Reactive  05/15/20 1130    HSV 1/2 by PCR (non-serum)       HSV 1/2 (serum)       HSV 1       HSV 2       HPV (16)       HPV (18)       HPV (other)       HBsAg Non-Reactive  05/15/20 1128    HIV-1 HIV-2 Antibodies Non-Reactive  05/15/20 1128    Rubella >500 IU/mL 05/15/20 1128    Tb             Pap Smear (Most Recent Result)     Test Value Date Time    Pap smear       Pap smear w/HPV       Pap smear w/CTNG       Pap smar w/HPV CTNG       Pap smear (reflex HPV ACUS)       Pap smear (reflex HPV ASCUS w/CTNG)  (See Report)   05/08/20 1014    Pathology gyn specimen  (See Report)   05/08/20 1014          Urinalysis (Most Recent Result)     Test Value Date Time    Urinalysis       Urinalysis (culture if indicated)       POC urinalysis  (See Report)   06/12/20 1616    Urine drug screen       Urine drug screen (w/o conf)       Urine culture ([id number])       Urine culture ([id number])             1st Trimester     Test Value Date Time    Hgb       Hct       Fasting Glucose Tolerance       GTT, 1 hour       GTT, 2 hours       GTT, 3 hours             2nd Trimester     Test Value Date Time    Hgb       Hct       Urinalysis       Urine Culture       AST       ALT       Uric Acid       Fasting Glucose Tolerance       GTT, 1 hour       GTT, 2 hours       GTT, 3 hours       Urine Protein/Creatinine Ratio             3rd Trimester     Test Value Date Time    Hgb 12.4 g/dL 05/15/20 1130      12.3 g/dL 05/15/20 1128    Hct 39.9 % 05/15/20 1130      39.4 % 05/15/20 1128    Platelet count 131 K/uL 05/15/20 1130      133 K/uL 05/15/20 1128    GBS (MONSIVAIS BROTH) POSITIVE  06/12/20 1637    GBS (Direct) POSITIVE   06/12/20 1637    Urinalysis       Urine Culture       Urine Drug Screen       Urine Protein/Creatinine Ratio  (See Report)   06/19/20 1755          Congenital Disease Screening     Test Value Date Time    First Trimester Screen       Quad Screen       Sickle Cell       Thalassemia       [facility]       Cystic Fibrosis Carrier Study                     Assessment:  Gestational Age:  37w1d based on LMP here for IOL due to PIH  Labor State:   Early latent labor  Risk Factors:   GBS +  Pregnancy Complications: AFO diagnosed on US. Infant will need     Patient Active Problem List    Diagnosis Date Noted   • Elevated blood pressure affecting pregnancy in third trimester, antepartum 06/19/2020   • GBS (group B Streptococcus carrier), +RV culture, currently pregnant 06/14/2020   • Supervision of normal intrauterine pregnancy in multigravida, third trimester 06/05/2020   • abnormal 1 hr normal 3 05/22/2020   • Abnormal fetal US - AFO and prominent cisterna magna - Needs PP fetal heart US, no f/u 05/17/2020   • Thrombocytopenia - 131, stable 05/16/2020   • Late prenatal care affecting pregnancy in third trimester 05/08/2020       Plan:   Admitted for: IOL for elevated BP  Patient will be admitted for elevated BP. Will get PIH labs and monitor BP.  Will induce labor with pitocin and AROM when appropriate.  GBS positive, starting penicillin  Prenatal labs: HIV non-reactive, Hep B non-reactive, RPR non-reactive, Rubella immune  Pain control: patient is requesting an epidural when she starts to have pain, nothing needed at this time.    Patient seen with and care discussed with the attending physician, Dr. Vazquez.      Foreign Escobar M.D.

## 2020-06-20 NOTE — PROGRESS NOTES
Patient admitted to the floor. VSS. Bands and cuddles checked. Bleeding light, fundus firm off to the right. Patient oriented to the room. Will continue to monitor.

## 2020-06-20 NOTE — LACTATION NOTE
This note was copied from a baby's chart.  @7053 Met with MOB to assist with feeding attempt, baby was at the breast asleep, MOB allowed LC to assist her to wake baby, assisted with and educated on proper positioning for a dep latch, with minimal assistance a deep latch was achieved, a nutritive suck was noted, encouraged frequent stimulation to maintain infant wakefulness for breastfeeding, encouraged frequent [id number], encouraged to call for assistance as needed

## 2020-06-20 NOTE — CARE PLAN
Problem: Potential for postpartum infection related to presence of episiotomy/vaginal tear and/or uterine contamination  Goal: Patient will be absent from signs and symptoms of infection  Outcome: PROGRESSING AS EXPECTED  Note: Patient is free from signs or symptoms of infection.      Problem: Alteration in comfort related to episiotomy, vaginal repair and/or after birth pains  Goal: Patient verbalizes acceptable pain level  Outcome: PROGRESSING AS EXPECTED  Note: Patient verbalizes acceptable pain level.

## 2020-06-20 NOTE — PROGRESS NOTES
PATIENT ID:  NAME:  Jackelyn Simmons  MRN:               [id number]  YOB: 1990    Subjective:   Pt resting in bed reporting contractions are more uncomfortable now.     Objective:    Vitals:    20 1939 208 20 2140 20 2240   BP: 136/93 124/86 124/83 126/77   Pulse: 81 83 74 83   SpO2:       Weight:       Height:           Cervix:  5cm/70%/-2, AROM @ 00:35 clear  [place]: Uterine Contractions Q 3-5 minutes.   FHRM: Baseline 150, moderate variability, Accels present , no decels   Pitocin: 9    Assessment:   30 y.o. female  at 37w2d.      Plan:   1. LR for IV hydration  2. Continued Pen G for GBS prophylaxis   3. Pt reports she is still coping well with the pain   4. Continuous fetal heart rate and maternal monitoring  5. Anticipate

## 2020-06-20 NOTE — PROGRESS NOTES
0700- Received report from MARICHUY Abel RN.  Pt sleeping.  Infant in warmer swaddled with heat off.  0745- Pt assisted up to bathroom.  Pt ambulates with a steady gait, denies nausea/dizziness.  Voided, mckenna care provided.  Pt ambulated back to bed to eat breakfast.  0832- Pt moved to S323 via wc in stable condition with baby in arms.  Report to HERI Eaton.

## 2020-06-20 NOTE — PROGRESS NOTES
190-Rcvd report from dayshift RN and assumed care of pt. Pt ambulated to labor room S223  IV started and admit profile completed.  Darleen SAAVEDRAM at bedside to see pt. Thorough discuss of POC.   -pitocin started per orders.  6-Darleen SAAVEDRAM at bedside to see pt. AROM with small amount of clear fluid noted. SVE=5/70/-2  0420-SVE=7-8/90/-1  0417-fentanyl given for pain.  0506- of viable baby boy with 8/8 apgars  0700-report given to dayshift RN

## 2020-06-20 NOTE — PROGRESS NOTES
1755 - Patient of [email or web address] presents from the office for PIH workup. Herrera Gestation 37.1 weeks    Patient was seen at [email or web address] today for a scheduled prenatal visit. Provider noted elevated BP and sent the patient to L&D for further evaluation.     Patient denies contractions/cramping, denies ROM or Bleeding. Denies change to vision/edema/HA, DTRs 2+ without clonus. Reports FM. FM/TOCO use discussed and placed. FOB at home POC discussed.     Dry erase board updated with RN contact information; reviewed.   Patient encouraged to call RN with all questions concerns needs prn.    Report to Dr. Escobar regarding patient arrival/complaint/status - initial BP.     1815 - Orders noted. Update by Dr. Escobar reveals plan for admission.  1820 - Dr. Escobar at  with the  Ipad to discuss current status and POC.    1835 - SVE at 3/50/floating - extreme post. No blood or fluid noted on the exam glove; Sharee YU RN.    1900 - Report to Gloria JEFFRIES RN

## 2020-06-21 VITALS
OXYGEN SATURATION: 96 % | DIASTOLIC BLOOD PRESSURE: 73 MMHG | WEIGHT: 185 LBS | SYSTOLIC BLOOD PRESSURE: 117 MMHG | HEIGHT: 65 IN | BODY MASS INDEX: 30.82 KG/M2 | HEART RATE: 76 BPM | TEMPERATURE: 97.7 F | RESPIRATION RATE: 19 BRPM

## 2020-06-21 RX ORDER — IBUPROFEN 800 MG/1
800 TABLET ORAL EVERY 8 HOURS PRN
Qty: 60 TAB | Refills: 0 | Status: SHIPPED | OUTPATIENT
Start: 2020-06-21 | End: 2022-01-28

## 2020-06-21 RX ORDER — ACETAMINOPHEN 500 MG
500-1000 TABLET ORAL EVERY 8 HOURS PRN
Qty: 60 TAB | Refills: 0 | Status: SHIPPED | OUTPATIENT
Start: 2020-06-21 | End: 2022-01-28

## 2020-06-21 RX ORDER — PSEUDOEPHEDRINE HCL 30 MG
100 TABLET ORAL 2 TIMES DAILY PRN
Qty: 60 CAP | Refills: 0 | Status: SHIPPED | OUTPATIENT
Start: 2020-06-21 | End: 2022-01-28

## 2020-06-21 NOTE — CARE PLAN
Problem: Altered physiologic condition related to immediate post-delivery state and potential for bleeding/hemorrhage  Goal: Patient physiologically stable as evidenced by normal lochia, palpable uterine involution and vital signs within normal limits  Outcome: PROGRESSING AS EXPECTED  Note: Patient is physiologically stable as evidenced by scant to light lochia rubra, firm fundus one fingerbreadth below the umbilicus, and vital signs WDL. Will continue to monitor patient condition.       Problem: Potential for postpartum infection related to presence of episiotomy/vaginal tear and/or uterine contamination  Goal: Patient will be absent from signs and symptoms of infection  Outcome: PROGRESSING AS EXPECTED  Note: Patient is afebrile and absent for other signs/symptoms of infection. Vital signs WDL.  Will continue to monitor patient condition.

## 2020-06-21 NOTE — PROGRESS NOTES
Pt declined  services for this encounter and verbalized understanding. Patient assessment completed. Discussed pain management plan and patient to request medication as needed. Patient denies dizziness and headaches; states she is voiding w/o difficulty. Reviewed plan of care, all questions answered, and rounding in place.

## 2020-06-21 NOTE — DISCHARGE INSTRUCTIONS
DISCHARGE INSTRUCTIONS ([place]) POSTPARTUM FOR MOM      NIKITA LAS MIKKI:  · Antes de tocar el bebé.  · Antes del amamantamiento o darle al bebé lactancia artificial.  · Después de usar el baño.    CUIDADO DE LAS HERIDAS:  · La Incisión de la Operación Cesárea:  Mantenga limpea y seca, NO levante nada que pesa más que blandon bebé por 6 semenas.  No debe ninguna apertura ni pus.  · Episiotomía/Domniic Vaginal:  Use un spray de Tucks o Dermoplast, tome un baño de asiento cuando necesite (6 pulgadas), siga usando la botella Ana Laura hasta que pare de sangrar.    CUIDADA DEL SENO:  · Lleve un sostén.  · Si esta` amamantando no use jabón en el seno ni en los pezones.  · Aplique lanolina si los pezones se ponen quebrazados y si le duelen.    CUIDADO DE LA VAGINA:  · Callao puede entrar la vagina por 6 semanas:  Actividad sexual, Ducha vaginal, Tampones.  · El sangramiento puede seguir por 2 a 4 semanas.  La cantidad es variable.  Llame a blandon med`ico(a) obstetra si hay mucha korina (si usa ma`s de fercho toalla femenina cada hora).    CONTRACEPCIÒN:  · Es posible embarazarse en cualquier tiempo despus del parto y mientras el amamantamiento.  · Debe planear de discutir los metodos de cantracepción con blandon médico(a) cuando vuelva en 6 semanas para blandon revisión médica.    DIETA Y DEFECACÒN:  · Para evitar la constipación coma mas fibra (cereal salvado, fruta, y verduras) Y tome muchos liquidos.   · Es común orinar frecuentemente después del parto.    POSTPARTO Y LA DEPRESÒN:  Melania los primeros chung después del parto es común sentirse:  · Impaciente, irritable, o llorar  Estos sentimientos llegan y van rapidamente.  No obstante, guy fercho de cada joselyn mujeres tiene síntomas emocionales conocidos una depresión postparto.  · Despresión Postparto:  Puede empezar tan pronto una el morro o tercer día después del parto o puede durar algunas semanas o meses para desarrollar.  Síntomas de la depresión pueden presentarse, rocco son más  intensos:  · Pérdida del hambre  · Frecuencia de llorar  · Sentirse desesperada o perder el control  · Demasiada o no suficiente preocupación con blandon bebé  · Tener miedo de tocar el bebé  · No preocuparse con blandon propia apariencia  · Incapacidad de dormir o dormir excesivamente    DEPRESIÒN / RIESGO AL SUICIDIO:    Cuando se le da de gilberto de alguna entidad de [facility], es importante aprender a mantener a fang de hacerse daño a sí mismo.    Reconocer los signos de advertencia:  · Cambios bruscos en la peronalidad, positiva o negativa, incluyendo aumento de la energia  · Regalar posesiones  · Cambios en patrones de comer - significativos cambios de peso - positivos o negativos  · Cambio de patrones para dormir - no poder dormir o dormir todo el tiempo  · Falta de voluntad o incapacidad de comunicarse  · Depresión  · Meena, desánimo y tristeza inusual  · Habla de querer morir  · Descuido del aspecto personal  · Rebeldía - comportamiento imprudente  · Retiro de personas y actividades que les gusta  · Confusión-incapacidad para concentrarse    Si usted o un ser querido observa cualquiera de estos comportamientos o tiene preocupaciones de hacerse daño a si mismo, aquí le damos lo que usted puede hacer:  · Hablar de ti - tus sentimientos y razones para hacerse louisa a si mismo  · Retire cualquier medio que se podría utilizar para lastimarse (ejemplos: píldoras, cuerdas, cordones de extensión, arma de juan ramon)  · Obtenga ayuda profesional de la comunidad (tomeka mental, abuso de sustancias, orientación psicológica)  · No estar solo: llamar a un contacto seguro - fercho persona que confía en que estará allí por usted  · Llamada local LINEA de CRISIS [phone] y [phone]  · Llamada local [employer] [place] [phone] or www.OneFineMeal.com  · Llamada gratuita nacional, líneas de prevención del suicidio  · Preventión del Suicidio Nacional [facility] [phone]  ([phone])  · [place] [employer] [place] 800-SUICIDE ([phone])       ([employer])

## 2020-06-21 NOTE — CARE PLAN
Problem: Altered physiologic condition related to immediate post-delivery state and potential for bleeding/hemorrhage  Goal: Patient physiologically stable as evidenced by normal lochia, palpable uterine involution and vital signs within normal limits  Outcome: PROGRESSING AS EXPECTED  Note: Fundus firm, lochia light. Vitals WDL.        Problem: Potential for postpartum infection related to presence of episiotomy/vaginal tear and/or uterine contamination  Goal: Patient will be absent from signs and symptoms of infection  Outcome: PROGRESSING AS EXPECTED  Note: No S/S of infection. Vital signs WDL. Pt. Not in distress at this time. Will continue to monitor.

## 2020-06-21 NOTE — LACTATION NOTE
This note was copied from a baby's chart.  @1000 met with MOB for follow-up lactation consult, MOB states baby has been breastfeeding well, she states she has been supplementing with formula in addition to breastfeeding because she feels she doesn't have enough breast milk, educated on expectations regarding maternal milk supply and infant milk intake when breastfeeding, educated on expected feeding frequency and duration, educated on expected urine and stool output, educated on potential impact of supplementation on infant breastfeeding and maternal milk supply, if MOB wishes to supplement LC encouraged her to give small amounts of supplement after offering first breastfeeding    MOB reports pain when she breastfeeds, LC offered education on the importance of a deep latch and the damage caused by a shallow latch, MOB agreed to allow LC to assist with breastfeeding attempt, MOB latched baby easily however latch appeared shallow and MOB reported pain, LC provided education on and assistance with proper positioning for a deep latch, with minimal assistance and a change in position a deep latch with widely-flanged lips was achieved and a nutritive suck was noted, encouraged frequent stimulation to maintain infant wakefulness for breastfeeding, baby did fall back to sleep after a few minutes however MOB reports baby was given 10 ml formula at 0830, baby has voided and stooled multiple times    Written and verbal information provided on outpatient breastfeeding assistance available at [facility] after [facility] and encouraged to call to schedule consult as needed, informed that [facility] is on hold for the time being but if interested in attending to check the hospital web site for information on when it will resume, zoom meeting information provided as well    Information provided on outpatient breastfeeding assistance available at [facility] after discharge, [facility] contact information provided  and mother encouraged to call to see about establishing enrollment if desired     Encouraged to call for assistance as needed

## 2020-06-21 NOTE — DISCHARGE SUMMARY
Discharge Summary:      Jackelyn Tobias      Admit Date:   2020  Discharge Date:  2020     Admitting diagnosis:  Delivery  37 weeks gestation of pregnancy  Discharge Diagnosis: Status post vaginal, spontaneous.  Pregnancy Complications: elevated BP with normal preeclampsia labs  Tubal Ligation:  no        History:  No past medical history on file.  OB History    Para Term  AB Living   2 2 2     2   SAB TAB Ectopic Molar Multiple Live Births           0 2      # Outcome Date GA Lbr Gio/2nd Weight Sex Delivery Anes PTL Lv   2 Term 20 37w2d / 00:06 3.315 kg (7 lb 4.9 oz) M Vag-Spont None N QUIN   1 Term 18 39w5d / 00:47 3.125 kg (6 lb 14.2 oz) M Vag-Spont None N QUIN        Patient has no known allergies.  Patient Active Problem List    Diagnosis Date Noted   • Elevated blood pressure affecting pregnancy in third trimester, antepartum 2020   • GBS (group B Streptococcus carrier), +RV culture, currently pregnant 2020   • Supervision of normal intrauterine pregnancy in multigravida, third trimester 2020   • abnormal 1 hr normal 3 2020   • Abnormal fetal US - AFO and prominent cisterna magna - Needs PP fetal heart US, no f/u 2020   • Thrombocytopenia - 131, stable 2020   • Late prenatal care affecting pregnancy in third trimester 2020        Hospital Course:   30 y.o. , now para 2, was admitted with the above mentioned diagnosis, underwent Induction of Labor, vaginal, spontaneous. Patient postpartum course was unremarkable, with progressive advancement in diet , ambulation and toleration of oral analgesia. Patient without complaints today and desires discharge.      Vitals:    20 0854 20 1400 20 1800 20 0600   BP: 111/83 110/80 135/81 117/73   Pulse: 100 94 84 76   Resp: 17 16 16 19   Temp: 36.7 °C (98.1 °F) 36.6 °C (97.9 °F) 36.6 °C (97.9 °F) 36.5 °C (97.7 °F)   TempSrc: Temporal Temporal Temporal Temporal    SpO2: 97% 96% 97% 96%   Weight:       Height:           Current Facility-Administered Medications   Medication Dose   • ibuprofen (MOTRIN) tablet 800 mg  800 mg   • acetaminophen (TYLENOL) tablet 325 mg  325 mg   • acetaminophen (TYLENOL) tablet 650 mg  650 mg   • LR infusion     • PRN oxytocin (PITOCIN) (20 Units/1000 mL) PRN for excessive uterine bleeding - See Admin Instr  125-999 mL/hr   • miSOPROStol (CYTOTEC) tablet 600 mcg  600 mcg   • docusate sodium (COLACE) capsule 100 mg  100 mg   • bisacodyl (DULCOLAX) suppository 10 mg  10 mg   • magnesium hydroxide (MILK OF MAGNESIA) suspension 30 mL  30 mL   • prenatal plus vitamin (STUARTNATAL 1+1) 27-1 MG tablet 1 Tab  1 Tab       Exam:  Temp:  [36.5 °C (97.7 °F)-36.9 °C (98.4 °F)] 36.5 °C (97.7 °F)  Pulse:  [] 76  Resp:  [16-19] 19  BP: (110-135)/(73-86) 117/73  SpO2:  [96 %-97 %] 96 %  General: No acute distress, lying comfortably in bed  HEENT: moist mucus membranes  Cardiovascular: RRR, no murmurs  Respiratory: Clear to auscultation bilaterally  Abdomen: Abdomen soft, non-tender. BS normal.   Fundus Non Tender: yes  Extremity: no edema, redness or tenderness in the calves or thighs     Labs:  Recent Labs     06/19/20  1820 06/20/20  1448   WBC 7.6 10.1   RBC 4.71 3.80*   HEMOGLOBIN 13.7 11.0*   HEMATOCRIT 41.7 33.5*   MCV 88.5 88.2   MCH 29.1 28.9   MCHC 32.9* 32.8*   RDW 51.0* 50.7*   PLATELETCT 135* 114*   MPV 13.0* 12.5        Activity:   Discharge to home  Pelvic Rest x 6 weeks    Assessment:  normal postpartum course and good candidate for nexplanon.    Discharge Assessment: Taking adequate diet and fluids, No heavy bleeding or foul vaginal discharge , Voiding without difficulty     Follow up: .[facility] or [facility] in 5 weeks for vaginal.    To resume daily PNV and iron supplement if needed with hydration.   Patient to RT [facility] or ER if any of the following occur:  Fever over 100.5  Severe abdominal pain  Red streaks or painful masses in the  breasts  Foul smelling discharge or lochia  Heavy vaginal bleeding saturating a pad per hour  S/s of PP depression     Discharge Meds:   Current Outpatient Medications   Medication Sig Dispense Refill   • docusate sodium 100 MG Cap Take 100 mg by mouth 2 times a day as needed for Constipation. 60 Cap 0   • ibuprofen (MOTRIN) 800 MG Tab Take 1 Tab by mouth every 8 hours as needed for Mild Pain or Moderate Pain. 60 Tab 0   • acetaminophen (TYLENOL) 500 MG Tab Take 1-2 Tabs by mouth every 8 hours as needed for Mild Pain or Moderate Pain. 60 Tab 0       Foreign Escobar M.D.

## 2020-07-30 ENCOUNTER — POST PARTUM (OUTPATIENT)
Dept: OBGYN | Facility: CLINIC | Age: 30
End: 2020-07-30
Payer: COMMERCIAL

## 2020-07-30 VITALS — BODY MASS INDEX: 26.96 KG/M2 | WEIGHT: 162 LBS | SYSTOLIC BLOOD PRESSURE: 124 MMHG | DIASTOLIC BLOOD PRESSURE: 82 MMHG

## 2020-07-30 PROBLEM — O09.33 LATE PRENATAL CARE AFFECTING PREGNANCY IN THIRD TRIMESTER: Status: RESOLVED | Noted: 2020-05-08 | Resolved: 2020-07-30

## 2020-07-30 PROBLEM — R73.09 ABNORMAL GTT (GLUCOSE TOLERANCE TEST): Status: RESOLVED | Noted: 2020-05-22 | Resolved: 2020-07-30

## 2020-07-30 PROBLEM — O99.820 GBS (GROUP B STREPTOCOCCUS CARRIER), +RV CULTURE, CURRENTLY PREGNANT: Status: RESOLVED | Noted: 2020-06-14 | Resolved: 2020-07-30

## 2020-07-30 PROBLEM — O16.3 ELEVATED BLOOD PRESSURE AFFECTING PREGNANCY IN THIRD TRIMESTER, ANTEPARTUM: Status: RESOLVED | Noted: 2020-06-19 | Resolved: 2020-07-30

## 2020-07-30 PROBLEM — D69.6 THROMBOCYTOPENIA (HCC): Status: RESOLVED | Noted: 2020-05-16 | Resolved: 2020-07-30

## 2020-07-30 PROBLEM — O28.3 ABNORMAL FETAL ULTRASOUND: Status: RESOLVED | Noted: 2020-05-17 | Resolved: 2020-07-30

## 2020-07-30 PROBLEM — Z34.83 SUPERVISION OF NORMAL INTRAUTERINE PREGNANCY IN MULTIGRAVIDA, THIRD TRIMESTER: Status: RESOLVED | Noted: 2020-06-05 | Resolved: 2020-07-30

## 2020-07-30 PROCEDURE — 0503F POSTPARTUM CARE VISIT: CPT | Performed by: NURSE PRACTITIONER

## 2020-07-30 ASSESSMENT — FIBROSIS 4 INDEX: FIB4 SCORE: 0.99

## 2020-07-30 NOTE — PATIENT INSTRUCTIONS
Plan   Plan:    1. Encourage SBE.  2. Mirena handout given to pt. Pt encouraged to f/u here for placement or [facility].  3. Contraceptive counseling   4. Encouraged condom use.  5. Discussed diet, exercise and resumption of normal activities.  6. Gave copy of pap, f/u 3 yr.  7.  F/u c PCP or [facility]

## 2020-07-30 NOTE — PROGRESS NOTES
Pt here today for postpartum exam.  Delivery Date:06/20/2020  Formula feeding only  BCM: Mirena IUD  LMP: not yet  WT: 162 lb  BP: 124/82  Pt states no complaints or concerns today  Good ph: [phone]

## 2020-07-30 NOTE — PROGRESS NOTES
Subjective:    Jackelyn Tobias is a 30 y.o.  female who presents for her postpartum exam. She had  without complication. Her prenatal course was complicated by elevated BPs. She denies dysuria, vaginal bleeding, odor, itching or breast problems. She is bottlefeeding. She desires IUD for her birth control method. Reports no sex prior to this appointment.  Denies any S/S of PP depression.     Postpartum care            HPI  Review of Systems   All other systems reviewed and are negative.         Objective:     /82   Wt 73.5 kg (162 lb)   LMP 10/03/2019 (Exact Date)   BMI 26.96 kg/m²    EPDS: 0    Physical Exam  Vitals signs and nursing note reviewed. Exam conducted with a chaperone present.   Constitutional:       Appearance: Normal appearance. She is well-developed and normal weight.   HENT:      Head: Normocephalic and atraumatic.   Eyes:      Comments: Eye and ear exam deferred   Neck:      Musculoskeletal: Normal range of motion and neck supple.      Thyroid: No thyromegaly.   Cardiovascular:      Rate and Rhythm: Normal rate and regular rhythm.      Pulses: Normal pulses.      Heart sounds: Normal heart sounds.   Pulmonary:      Effort: Pulmonary effort is normal.      Breath sounds: Normal breath sounds.   Chest:      Breasts: Breasts are symmetrical.         Right: No inverted nipple, mass, nipple discharge, skin change or tenderness.         Left: No inverted nipple, mass, nipple discharge, skin change or tenderness.   Abdominal:      General: Abdomen is flat. Bowel sounds are normal.      Palpations: Abdomen is soft.   Genitourinary:     General: Normal vulva.      Exam position: Supine.      Labia:         Right: No rash, tenderness, lesion or injury.         Left: No rash, tenderness, lesion or injury.       Vagina: Normal.      Cervix: Normal.      Uterus: Normal.       Adnexa: Right adnexa normal and left adnexa normal.        Right: No mass, tenderness or fullness.           Left: No mass, tenderness or fullness.     Musculoskeletal: Normal range of motion.   Skin:     General: Skin is warm and dry.      Capillary Refill: Capillary refill takes less than 2 seconds.   Neurological:      Mental Status: She is alert and oriented to person, place, and time.      Deep Tendon Reflexes: Reflexes are normal and symmetric.   Psychiatric:         Mood and Affect: Mood normal.         Behavior: Behavior normal.         Thought Content: Thought content normal.         Judgment: Judgment normal.               Assessment   Assessment:    1. PP care s/p   2. Exam WNL   3. Pap WNL   4. Desires contraception     Patient Active Problem List    Diagnosis Date Noted   • Postpartum care following vaginal delivery 2020       Plan   Plan:    1. Encourage SBE.  2. Mirena handout given to pt. Pt encouraged to f/u here for placement or [facility].  3. Contraceptive counseling   4. Encouraged condom use.  5. Discussed diet, exercise and resumption of normal activities.  6. Gave copy of pap, f/u 3 yr.  7.  F/u c PCP or [facility] clinic as needed for primary care needs.     Chaperone offered and provided by Slime Kwok MA.

## 2020-07-30 NOTE — PROGRESS NOTES
Subjective:      Jackelyn Tobias is a 30 y.o. female who presents with Postpartum care            HPI    ROS       Objective:     LMP 10/03/2019 (Exact Date)      Physical Exam            Assessment/Plan:       There are no diagnoses linked to this encounter.

## 2020-07-31 ENCOUNTER — TELEPHONE (OUTPATIENT)
Dept: OBGYN | Facility: CLINIC | Age: 30
End: 2020-07-31

## 2020-07-31 NOTE — TELEPHONE ENCOUNTER
Pt called stating she wants to get the Mirena IUD for BC, stated she was informed by [employer] that [facility] have received a haydee to help uninsured pt's for birth control. Pt wants to know if the Mirena is part of the haydee. I spoke with Delphine JACOB and was infomed by her that infact there is now haydee that covers BC incuding Mirena IUDs. Delphine said there is a process to it and she will send pt's request and information to the person in charge. at this time we don't known how long this process takes to get it approved since is something new. I informed pt. and told her to give it a week and if she has not hear form us to call us back. Pt verbalized understanding and had no further questions. (Pt's MRN and request was provided to Delphine JACOB)

## 2020-08-12 ENCOUNTER — GYNECOLOGY VISIT (OUTPATIENT)
Dept: OBGYN | Facility: CLINIC | Age: 30
End: 2020-08-12
Payer: COMMERCIAL

## 2020-08-12 VITALS
SYSTOLIC BLOOD PRESSURE: 100 MMHG | DIASTOLIC BLOOD PRESSURE: 70 MMHG | HEIGHT: 65 IN | WEIGHT: 159 LBS | BODY MASS INDEX: 26.49 KG/M2

## 2020-08-12 DIAGNOSIS — Z30.430 ENCOUNTER FOR INSERTION OF MIRENA IUD: Primary | ICD-10-CM

## 2020-08-12 LAB
INT CON NEG: NEGATIVE
INT CON POS: POSITIVE
POC URINE PREGNANCY TEST: NEGATIVE

## 2020-08-12 PROCEDURE — 81025 URINE PREGNANCY TEST: CPT | Performed by: NURSE PRACTITIONER

## 2020-08-12 PROCEDURE — 58300 INSERT INTRAUTERINE DEVICE: CPT | Performed by: NURSE PRACTITIONER

## 2020-08-12 ASSESSMENT — FIBROSIS 4 INDEX: FIB4 SCORE: 0.99

## 2020-08-12 NOTE — PROCEDURES
IUD Insertion    Date/Time: 8/12/2020 4:03 PM  Performed by: VOLODYMYR Simms  Authorized by: VOLODYMYR Simms     Consent:     Consent obtained:  Written and verbal    Consent given by:  Patient    Procedure risks and benefits discussed: yes      Patient questions answered: yes      Patient agrees, verbalizes understanding, and wants to proceed: yes      Educational handouts given: yes      Instructions and paperwork completed: yes    Universal protocol:     Patient states understanding of procedure being performed: yes      Relevant documents present and verified: yes      Test results available and properly labeled: yes      Imaging studies available: no      Required blood products, implants, devices, and special equipment available: no      Site marked: no    Pre-procedure details:     Negative GC/chlamydia test: no      Negative urine pregnancy test: yes      Negative serum pregnancy test: no    Procedure:     Pelvic exam performed: yes      Sterile speculum placed in vagina: yes      Cervix visualized: yes      Cervix cleaned and prepped in sterile fashion: yes      Tenaculum applied to cervix: yes      Dilation needed: no      Uterus sounded: yes      Uterus sound depth (cm):  7    IUD inserted with no complications: yes      IUD type:  Mirena    Strings trimmed: yes    Post-procedure:     Patient tolerated procedure well: yes      Patient will follow up after next period: yes    Comments:      Lot: [id number]  Expiration: 11/2022        Performed by DONYA NEAL with supervision by MARIAH Palmer CNM

## 2020-08-12 NOTE — NON-PROVIDER
Pt here today for Insertion of Mirena  UPT Result: negative  LMP: 8/6/2020  Current BCM: none  Phone #: [phone]  Pharmacy verified and confirmed

## 2020-10-26 NOTE — PROGRESS NOTES
SUBJECTIVE:  Pt is a 30 y.o.   at 33w1d  gestation. Presents today for follow-up prenatal care. Reports no issues at this time.  Reports good  fetal movement. Denies regular cramping/contractions, bleeding or leaking of fluid. Denies dysuria, headaches, N/V. Generally feels well today. Will do glucose test after this appt. Pt reports she is not sure what her baseline weight is as she was losing weight before pregnancy.     OBJECTIVE:  - See prenatal vitals flow  -   Vitals:    20 1048   BP: 118/68   Weight: 83.5 kg (184 lb)                 ASSESSMENT:   - IUP at 33w1d    - S=D   -   Patient Active Problem List    Diagnosis Date Noted   • Abnormal fetal ultrasound-AFO and prominent cisterna magna 2020   • Thrombocytopenia  2020   • Late prenatal care affecting pregnancy in third trimester 2020         PLAN:  - S/sx pregnancy and labor warning signs vs general discomforts discussed  - Fetal movements and/or kick counts reviewed   - Adequate hydration reinforced  - Nutrition/exercise/vitamin education; continue PNV  - Pt needs [facility] appt; given contact to call  - Reviewed thrombocytopenia   - Reviewed 35 pounds weight gain in 2 weeks and pt to focus on portion size, sugar and carb intake and try to get some physical activity in as she does not   - S/p Tdap vacc   - Anticipatory guidance given  - RTC in 2 weeks for follow-up prenatal care     Juany Sandhu

## 2020-11-16 LAB — HIV 1+2 AB+HIV1 P24 AG SERPL QL IA: NORMAL

## 2022-01-17 ENCOUNTER — TELEPHONE (OUTPATIENT)
Dept: SCHEDULING | Facility: IMAGING CENTER | Age: 32
End: 2022-01-17

## 2022-01-28 ENCOUNTER — OFFICE VISIT (OUTPATIENT)
Dept: MEDICAL GROUP | Facility: PHYSICIAN GROUP | Age: 32
End: 2022-01-28
Payer: COMMERCIAL

## 2022-01-28 VITALS
DIASTOLIC BLOOD PRESSURE: 70 MMHG | SYSTOLIC BLOOD PRESSURE: 102 MMHG | WEIGHT: 151 LBS | OXYGEN SATURATION: 99 % | BODY MASS INDEX: 25.16 KG/M2 | HEIGHT: 65 IN | RESPIRATION RATE: 20 BRPM | TEMPERATURE: 99 F | HEART RATE: 62 BPM

## 2022-01-28 DIAGNOSIS — F33.1 MODERATE EPISODE OF RECURRENT MAJOR DEPRESSIVE DISORDER (HCC): ICD-10-CM

## 2022-01-28 PROCEDURE — 99203 OFFICE O/P NEW LOW 30 MIN: CPT | Performed by: NURSE PRACTITIONER

## 2022-01-28 RX ORDER — ESCITALOPRAM OXALATE 10 MG/1
10 TABLET ORAL DAILY
Qty: 90 TABLET | Refills: 0 | Status: SHIPPED | OUTPATIENT
Start: 2022-01-28 | End: 2022-04-29

## 2022-01-28 ASSESSMENT — PATIENT HEALTH QUESTIONNAIRE - PHQ9
SUM OF ALL RESPONSES TO PHQ QUESTIONS 1-9: 13
5. POOR APPETITE OR OVEREATING: 3 - NEARLY EVERY DAY
CLINICAL INTERPRETATION OF PHQ2 SCORE: 1

## 2022-01-28 ASSESSMENT — FIBROSIS 4 INDEX: FIB4 SCORE: 1.06

## 2022-01-29 NOTE — PROGRESS NOTES
Subjective  Chief Complaint  Establish care to manage her chronic conditions    History of Present Illness  Jackelyn Tobias is a 32 y.o. female. This patient is here today to establish care.    Moderate episode of recurrent major depressive disorder (HCC)  Chronic and ongoing. She has been seeing a Therapist for awhile but she was told to see a primary care provider. She is wanting to start a medication for her depression at this time. Denies any thoughts of suicide at this time.    Past Medical History    Allergies: Patient has no known allergies.  Past Medical History:   Diagnosis Date   • Moderate episode of recurrent major depressive disorder (HCC) 1/28/2022   • Postpartum care following vaginal delivery 7/30/2020     History reviewed. No pertinent surgical history.  Current Outpatient Medications Ordered in Epic   Medication Sig Dispense Refill   • escitalopram (LEXAPRO) 10 MG Tab Take 1 Tablet by mouth every day. 90 Tablet 0     No current Epic-ordered facility-administered medications on file.     Family History:    Family History   Problem Relation Age of Onset   • Diabetes Mother    • Arthritis Mother    • No Known Problems Father    • No Known Problems Brother    • No Known Problems Maternal Grandmother    • Diabetes Maternal Grandfather    • Heart Disease Paternal Grandmother    • Kidney Disease Paternal Grandfather       Personal/Social History:    Social History     Tobacco Use   • Smoking status: Never Smoker   • Smokeless tobacco: Never Used   Vaping Use   • Vaping Use: Never used   Substance Use Topics   • Alcohol use: Never   • Drug use: Never     Social History     Social History Narrative   • Not on file      Review of Systems:     General: Negative for fever/chills and unexpected weight change.    Eyes:  Negative for vision changes, eye pain.   Respiratory:  Negative for cough and dyspnea.     Cardiovascular:  Negative for chest pain and palpitations.   Skin:  Negative for rash.     "Neurological:  Negative for numbness/tingling and headaches.     Objective  Physical Exam:   /70 (BP Location: Right arm, Patient Position: Sitting, BP Cuff Size: Adult)   Pulse 62   Temp 37.2 °C (99 °F) (Temporal)   Resp 20   Ht 1.651 m (5' 5\")   Wt 68.5 kg (151 lb)   SpO2 99%  Body mass index is 25.13 kg/m².  General:  Alert and oriented.  Well appearing.  NAD.  Head:  Normocephalic.   Neck: Supple without JVD. No lymphadenopathy.  Pulmonary:  Normal effort.  Clear to ausculation without rales, ronchi, or wheezing.  Cardiovascular:  Regular rate and rhythm without murmur, rubs or gallop.  Radial pulses are intact and equal bilaterally.  Musculoskeletal:  No extremity cyanosis, clubbing, or edema.  Skin:  Warm and dry.  No obvious lesions.  Psych: Normal mood and affect. Alert and oriented x3. Judgment and insight is normal.     services were used in the patient's primary language of [place].     Name or Number: [id number]  Mode of interpretation: iPad      Assessment/Plan   1. Moderate episode of recurrent major depressive disorder (HCC)  Chronic and ongoing.  Discussed Escitalopram risks, benefits and side effects, she verbalized understanding.  Take Escitalopram 10 mg daily.  Discussed following up with me in 3 weeks, she verbalized understanding.  - escitalopram (LEXAPRO) 10 MG Tab; Take 1 Tablet by mouth every day.  Dispense: 90 Tablet; Refill: 0      Health Maintenance: Completed    Return in about 3 weeks (around 2/18/2022) for Medication F/U.    Please note that this dictation was created using voice recognition software. I have made every reasonable attempt to correct obvious errors, but I expect that there are errors of grammar and possibly content that I did not discover before finalizing the note.    JUAN A Corea  [facility] [place]  "

## 2022-01-29 NOTE — ASSESSMENT & PLAN NOTE
Chronic and ongoing. She has been seeing a Therapist for awhile but she was told to see a primary care provider. She is wanting to start a medication for her depression at this time. Denies any thoughts of suicide at this time.

## 2022-01-29 NOTE — PATIENT INSTRUCTIONS
Escitalopram tablets  ¿Qué es hayes medicamento?  El ESCITALOPRAM se utiliza para el tratamiento de la depresión y ciertos tipos de ansiedad.  Hayes medicamento puede ser utilizado para otros usos; si tiene alguna pregunta consulte con blandon proveedor de atención médica o con blandon farmacéutico.  MARCAS COMUNES: Lexapro  ¿Qué le catie informar a mi profesional de la tomeka antes de raghu hayes medicamento?  Necesita saber si usted presenta alguno de los siguientes problemas o situaciones:  · trastorno bipolar o antecedentes familiares del trastorno bipolar  · diabetes  · glaucoma  · enfermedad cardiaca  · enfermedad renal o hepática  · recibe tratamiento electroconvulsivo  · convulsiones  · ideas suicidas, planes o si usted o alguien de blandon abigail ha intentado un suicidio previo  · fercho reacción alérgica o inusual al escitalopram, al medicamento relacionado citalopram, a otros medicamentos, alimentos, colorantes o conservadores  · si está embarazada o buscando quedar embarazada  · si está amamantando a un bebé  ¿Cómo catie utilizar hayes medicamento?  [place] hayes medicamento por vía oral con un vaso de agua. Siga las instrucciones de la etiqueta del medicamento. Puede tomarlo con o sin alimentos. Si el medicamento le produce malestar estomacal, tómelo con alimentos. [place] blandon medicamento a intervalos regulares. No lo tome con fercho frecuencia mayor a la indicada. No deje de raghu hayes medicamento de repente a menos que así lo indique blandon médico. Dejar de utilizar hayes medicamento demasiado rápido puede causar efectos secundarios graves o podría empeorar blandon afección.  [place] farmacéutico le dará fercho Guía del medicamento especial (MedGuide, nombre en inglés) con cada receta y en cada ocasión que la vuelva a surtir. Asegúrese de leer esta información cada vez cuidadosamente.  Hable con blandon pediatra para informarse acerca del uso de hayes medicamento en niños. Puede requerir atención especial.  Sobredosis: Póngase en contacto inmediatamente con  un centro toxicológico o fercho saba de urgencia si usted siomara que haya tomado demasiado medicamento.  ATENCIÓN: Hayes medicamento es solo para usted. No comparta hayes medicamento con nadie.  ¿Qué sucede si me olvido de fercho dosis?  Si olvida fercho dosis, tómela lo antes posible. Si es guy la hora de la próxima dosis, tome sólo dominic dosis. No tome dosis adicionales o dobles.  ¿Qué puede interactuar con hayes medicamento?  No tome hayes medicamento con ninguno de los siguientes fármacos:  ciertos medicamentos para infecciones micóticas, tales una fluconazol, itraconazol, ketoconazol, posaconazol y voriconazol cisaprida citalopram dofetilida dronedarona linezolida IMAO, tales una Carbex, Eldepryl, Marplan, Nardil y Parnate alex de metileno (inyectado en fercho vena) pimozida tioridazina ziprasidona  Hayes medicamento también puede interactuar con los siguientes medicamentos:  alcohol anfetaminas aspirina y medicamentos tipo aspirina carbamazepina ciertos medicamentos para la depresión, ansiedad o trastornos psicóticos ciertos medicamentos para la migraña, tales una almotriptán, eletriptán, frovatriptán, naratriptán, rizatriptán, sumatriptán y zolmitriptán ciertos medicamentos para conciliar el sueño ciertos medicamentos que tratan o previenen coágulos sanguíneos, tales una warfarina, enoxaparina, dalteparina cimetidina diuréticos fentanilo furazolidona isoniazida litio metoprolol MIK, medicamentos para el dolor y la inflamación, una ibuprofeno o naproxeno otros medicamentos que prolongan el intervalo QT (causan un ritmo cardiaco anormal) procarbazina rasagilina suplementos tales una hierba de [place], kava kava y valeriana tramadol triptófano  Puede ser que esta lista no menciona todas las posibles interacciones. Informe a blandon profesional de la tomeka de todos los productos a base de hierbas, medicamentos de venta yas o suplementos nutritivos que esté tomando. Si usted fuma, consume bebidas alcohólicas o si utiliza drogas  ilegales, indíqueselo también a blandon profesional de la tomeka. Algunas sustancias pueden interactuar con blandon medicamento.  ¿A qué catie estar atento al usar hayes medicamento?  Informe a blandon médico si arias síntomas no mejoran o si empeoran. Visite a blandon médico o a blandon profesional de la tomeka para chequear blandon evolución periódicamente. Debido que puede ser necesario raghu hayes medicamento debbie varias semanas para que sea posible observar arias efectos en forma completa, es importante que sigue blandon tratamiento una recetado por blandon médico.  Los pacientes y arias familias deben estar atentos si empeora la depresión o ideas suicidas. También esté atento a cambios repentinos o severos de emoción, tales una el sentirse ansioso, agitado, lleno de pánico, irritable, hostil, agresivo, impulsivo, inquietud severa, demasiado excitado y hiperactivo o dificultad para conciliar el sueño. Si esto ocurre, especialmente al comenzar con el tratamiento o al cambiar de dosis, comuníquese con blandon profesional de la tomeka.  Puede experimentar somnolencia o mareos. No conduzca ni utilice maquinaria, ni yisel nada que le exija permanecer en estado de alerta hasta que sepa cómo le afecta hayes medicamento. No se siente ni se ponga de pie con rapidez, especialmente si es un paciente de edad avanzada. Mabie reduce el riesgo de mareos o desmayos. El alcohol puede interferir con el efecto de hayes medicamento. Evite consumir bebidas alcohólicas.  Se le podrá secar la boca. Masticar chicle sin azúcar, chupar caramelos duros y beber agua en abundancia le ayudará a mantener la boca húmeda. Si el problema no desaparece o es severo, consulte a blandon médico.  ¿Qué efectos secundarios puedo tener al utilizar hayse medicamento?  Efectos secundarios que debe informar a blandon médico o a blandon profesional de la tomeka tan pronto una sea posible:  reacciones alérgicas, una erupción cutánea, comezón/picazón o urticarias, e hinchazón de la zac, los labios o la lengua ansiedad heces de  color minerva y aspecto alquitranado cambios en la visión confusión estado de ánimo elevado, kee necesidad de dormir, pensamientos acelerados, conducta impulsiva dolor ocular ritmo cardiaco rápido, irregular sensación de desmayos o aturdimiento, caídas sensación de agitación, enojo o irritabilidad alucinaciones, pérdida del contacto con la realidad pérdida de equilibrio o coordinación pérdida de memoria erección dolorosa o prolongada inquietud, caminar de un lado a otro, incapacidad para quedarse quieto convulsiones rigidez de los músculos ideas suicidas u otros cambios en el estado de ánimo dificultad para conciliar el sueño sangrado o moretones inusuales cansancio o debilidad inusual vómito  Efectos secundarios que generalmente no requieren atención médica (infórmelos a blandon médico o a blandon profesional de la tomeka si persisten o si son molestos):  cambios en el apetito cambios en el deseo o desempeño sexual dolor de tena aumento de la sudoración indigestión, náuseas temblores  Puede ser que esta lista no menciona todos los posibles efectos secundarios. Comuníquese a blandon médico por asesoramiento médico sobre los efectos secundarios. [place] puede informar los efectos secundarios a la FDA por teléfono al [phone].  ¿Dónde catie guardar mi medicina?  [place]anté[place]ala fuera del alcance de los niños.  Guárdela a temperatura ambiente, entre 15 y 30 grados C (59 y 86 grados F). Deseche todo el medicamento que no haya utilizado, después de la fecha de vencimiento.  ATENCIÓN: Karin folleto es un resumen. Puede ser que no cubra toda la posible información. Si usted tiene preguntas acerca de esta medicina, consulte con blandon médico, blandon farmacéutico o blandon profesional de la tomeka.  © 2020 Elsevier/Gold Standard (2018-01-18 00:00:00)

## 2022-04-29 DIAGNOSIS — F33.1 MODERATE EPISODE OF RECURRENT MAJOR DEPRESSIVE DISORDER (HCC): ICD-10-CM

## 2022-04-29 RX ORDER — ESCITALOPRAM OXALATE 10 MG/1
10 TABLET ORAL DAILY
Qty: 90 TABLET | Refills: 0 | Status: SHIPPED | OUTPATIENT
Start: 2022-04-29 | End: 2022-08-04

## 2022-08-04 DIAGNOSIS — F33.1 MODERATE EPISODE OF RECURRENT MAJOR DEPRESSIVE DISORDER (HCC): ICD-10-CM

## 2022-08-04 RX ORDER — ESCITALOPRAM OXALATE 10 MG/1
10 TABLET ORAL DAILY
Qty: 90 TABLET | Refills: 0 | Status: SHIPPED | OUTPATIENT
Start: 2022-08-04 | End: 2022-12-18

## 2022-12-18 ENCOUNTER — OFFICE VISIT (OUTPATIENT)
Dept: URGENT CARE | Facility: PHYSICIAN GROUP | Age: 32
End: 2022-12-18
Payer: COMMERCIAL

## 2022-12-18 VITALS
OXYGEN SATURATION: 98 % | DIASTOLIC BLOOD PRESSURE: 68 MMHG | SYSTOLIC BLOOD PRESSURE: 118 MMHG | HEIGHT: 65 IN | TEMPERATURE: 97.4 F | BODY MASS INDEX: 24.32 KG/M2 | WEIGHT: 146 LBS | HEART RATE: 70 BPM | RESPIRATION RATE: 14 BRPM

## 2022-12-18 DIAGNOSIS — J02.0 STREP THROAT: ICD-10-CM

## 2022-12-18 PROCEDURE — 99213 OFFICE O/P EST LOW 20 MIN: CPT | Performed by: FAMILY MEDICINE

## 2022-12-18 RX ORDER — AMOXICILLIN 875 MG/1
875 TABLET, COATED ORAL 2 TIMES DAILY
Qty: 20 TABLET | Refills: 0 | Status: SHIPPED | OUTPATIENT
Start: 2022-12-18 | End: 2022-12-28

## 2022-12-18 NOTE — PROGRESS NOTES
"CC:  presents with Pharyngitis            Pharyngitis   This is a new problem. The current episode started in the past 3 days. The problem has been unchanged. There has been subj fever. The pain is mild. Associated symptoms include a dry cough. Pertinent negatives include no abdominal pain,   diarrhea, headaches, shortness of breath or vomiting. no exposure to strep or mono.   has tried acetaminophen for the symptoms. The treatment provided mild relief.     Social History     Tobacco Use    Smoking status: Never    Smokeless tobacco: Never   Vaping Use    Vaping Use: Never used   Substance Use Topics    Alcohol use: Never    Drug use: Never       Past Medical History:   Diagnosis Date    Moderate episode of recurrent major depressive disorder (HCC) 1/28/2022    Postpartum care following vaginal delivery 7/30/2020       Review of Systems    HENT: Positive for sore throat  Respiratory: Negative for  sputum production and shortness of breath.    Cardiovascular: Negative for chest pain.   Gastrointestinal: Negative for nausea, vomiting, abdominal pain and diarrhea.   Genitourinary: Negative.    Neurological: Negative for dizziness and headaches.   All other systems reviewed and are negative.         Objective:   /68   Pulse 70   Temp 36.3 °C (97.4 °F) (Temporal)   Resp 14   Ht 1.651 m (5' 5\")   Wt 66.2 kg (146 lb)   SpO2 98%         Physical Exam   Constitutional:   oriented to person, place, and time.  appears well-developed and well-nourished. No distress.   HENT:   Head: Normocephalic and atraumatic.   Right Ear: External ear normal.   Left Ear: External ear normal.   Nose: Mucosal edema present. Right sinus exhibits no maxillary sinus tenderness and no frontal sinus tenderness. Left sinus exhibits no maxillary sinus tenderness and no frontal sinus tenderness.   Mouth/Throat: no posterior oropharyngeal exudate.   There is posterior oropharyngeal erythema present. No posterior oropharyngeal edema. "   Tonsils 2+ bilaterally     Eyes: Conjunctivae and EOM are normal. Pupils are equal, round, and reactive to light. Right eye exhibits no discharge. Left eye exhibits no discharge. No scleral icterus.   Neck: Normal range of motion. Neck supple. No JVD present. No tracheal deviation present. No thyromegaly present.   Cardiovascular: Normal rate, regular rhythm, normal heart sounds and intact distal pulses.  Exam reveals no friction rub.    No murmur heard.  Pulmonary/Chest: Effort normal and breath sounds normal. No respiratory distress.   no wheezes.   no rales.    Musculoskeletal:  exhibits no edema.   Lymphadenopathy:    no cervical LAD  Neurological:   alert and oriented to person, place, and time.   Skin: Skin is warm and dry. No erythema.   Psychiatric:   normal mood and affect.   Nursing note and vitals reviewed.             Assessment/Plan:        1. Strep throat   Rapid strep positive    - amoxicillin (AMOXIL) 875 MG tablet; Take 1 Tablet by mouth 2 times a day for 10 days.  Dispense: 20 Tablet; Refill: 0   Differential diagnosis, natural history, supportive care, and indications for immediate follow-up discussed. All questions answered. Patient agrees with the plan of care.     Follow-up as needed if symptoms worsen or fail to improve to PCP, Urgent care or Emergency Room.     I have personally reviewed prior external notes and test results pertinent to today's visit.  I have independently reviewed and interpreted all diagnostics ordered during this urgent care acute visit.

## 2024-04-02 ENCOUNTER — TELEPHONE (OUTPATIENT)
Dept: OBGYN | Facility: CLINIC | Age: 34
End: 2024-04-02
Payer: COMMERCIAL

## 2024-04-03 ENCOUNTER — HOSPITAL ENCOUNTER (OUTPATIENT)
Facility: MEDICAL CENTER | Age: 34
End: 2024-04-03
Attending: OBSTETRICS & GYNECOLOGY
Payer: COMMERCIAL

## 2024-04-03 ENCOUNTER — APPOINTMENT (OUTPATIENT)
Dept: OBGYN | Facility: CLINIC | Age: 34
End: 2024-04-03
Payer: COMMERCIAL

## 2024-04-03 VITALS
SYSTOLIC BLOOD PRESSURE: 138 MMHG | DIASTOLIC BLOOD PRESSURE: 83 MMHG | BODY MASS INDEX: 24.99 KG/M2 | HEIGHT: 65 IN | WEIGHT: 150 LBS

## 2024-04-03 DIAGNOSIS — Z01.419 WOMEN'S ANNUAL ROUTINE GYNECOLOGICAL EXAMINATION: ICD-10-CM

## 2024-04-03 DIAGNOSIS — Z30.432 ENCOUNTER FOR IUD REMOVAL: ICD-10-CM

## 2024-04-03 DIAGNOSIS — Z01.419 WOMEN'S ANNUAL ROUTINE GYNECOLOGICAL EXAMINATION: Primary | ICD-10-CM

## 2024-04-03 PROCEDURE — 3075F SYST BP GE 130 - 139MM HG: CPT | Performed by: OBSTETRICS & GYNECOLOGY

## 2024-04-03 PROCEDURE — 3079F DIAST BP 80-89 MM HG: CPT | Performed by: OBSTETRICS & GYNECOLOGY

## 2024-04-03 PROCEDURE — 58301 REMOVE INTRAUTERINE DEVICE: CPT | Performed by: OBSTETRICS & GYNECOLOGY

## 2024-04-03 PROCEDURE — 88175 CYTOPATH C/V AUTO FLUID REDO: CPT

## 2024-04-03 PROCEDURE — 99385 PREV VISIT NEW AGE 18-39: CPT | Performed by: OBSTETRICS & GYNECOLOGY

## 2024-04-03 PROCEDURE — 87624 HPV HI-RISK TYP POOLED RSLT: CPT

## 2024-04-03 NOTE — PROGRESS NOTES
ANNUAL GYNECOLOGY VISIT    Chief Complaint  New Patient      Subjective  Jackelyn Tobias is a 34 y.o. female who presents today for an annual exam.  Her main concern today is she would like her Mirena IUD removed.  Her  has a vasectomy.  He has had the vasectomy for about 2 years.  However, he never had follow-up after vasectomy to make sure the vasectomy worked.  She still desires removal of the Mirena today.  She does not have any cycles on the Mirena.    Preventive Care   Immunization History   Administered Date(s) Administered    MODERNA SARS-COV-2 VACCINE (12+) 04/10/2021, 2021, 2022    Tdap Vaccine 2020     Last Mammogram: NA    Gynecology History and ROS  Current Sexual Activity: Yes  History of sexually transmitted diseases?  no  Abnormal discharge? No  Current Contraception:  Mirena, partner has vasectomy     Pap History  Last pap smear:   History of moderate or severe dysplasia: No    Cancer Risk Assessement:  Family history of:   - Breast cancer: no   - Ovarian cancer: no   - Uterine cancer: no   - Colon cancer: no    Obstetric History  OB History    Para Term  AB Living   2 2 2     2   SAB IAB Ectopic Molar Multiple Live Births           0 2      # Outcome Date GA Lbr Gio/2nd Weight Sex Delivery Anes PTL Lv   2 Term 20 37w2d / 00:06 7 lb 4.9 oz M Vag-Spont None N QUIN   1 Term 18 39w5d / 00:47 6 lb 14.2 oz M Vag-Spont None N QUIN       Past Medical History  Past Medical History:   Diagnosis Date    Migraine     Moderate episode of recurrent major depressive disorder (HCC) 2022    Postpartum care following vaginal delivery 2020       Past Surgical History  History reviewed. No pertinent surgical history.    Social History  Social History     Socioeconomic History    Marital status:      Spouse name: Not on file    Number of children: Not on file    Years of education: Not on file    Highest education level: Not on file  "  Occupational History    Not on file   Tobacco Use    Smoking status: Never    Smokeless tobacco: Never   Vaping Use    Vaping Use: Never used   Substance and Sexual Activity    Alcohol use: Not Currently    Drug use: Never    Sexual activity: Yes     Partners: Male     Birth control/protection: Male Sterilization   Other Topics Concern    Not on file   Social History Narrative    Not on file     Social Determinants of Health     Financial Resource Strain: Not on file   Food Insecurity: Not on file   Transportation Needs: Not on file   Physical Activity: Not on file   Stress: Not on file   Social Connections: Not on file   Intimate Partner Violence: Not on file   Housing Stability: Not on file       Family History  Family History   Problem Relation Age of Onset    Diabetes Mother     Arthritis Mother     No Known Problems Father     No Known Problems Brother     No Known Problems Maternal Grandmother     Diabetes Maternal Grandfather     Heart Disease Paternal Grandmother     Kidney Disease Paternal Grandfather        Home Medications  No current outpatient medications on file prior to visit.     No current facility-administered medications on file prior to visit.       Allergies/Reactions  No Known Allergies    ROS  Positive ROS: reports occasional fatigue  Gen: no fevers or chills, no significant weight loss or gain  Respiratory:  no cough or dyspnea  Cardiac:  no chest pain, no palpitations, no syncope  Breast: no breast discharge, pain, lump or skin changes  GI:  no heartburn, no abdominal pain, no nausea or vomiting  Urinary: no dysuria, urgency, frequency, incontinence   Psych: no depression or anxiety  Neuro: no migraines with aura, fainting spells, numbness or tingling  Extremities: no joint pain, persistently swollen ankles, recurrent leg cramps    Physical Examination:  Vital Signs:   Vitals:    04/03/24 1455   BP: 138/83   BP Location: Left arm   Patient Position: Sitting   Weight: 150 lb   Height: 5' 5\" "     Body mass index is 24.96 kg/m².    Constitutional: The patient is well developed and well nourished.  Psychiatric: Patient is oriented to time place and person.   Skin: No rash observed.  Neck: Appears symmetric.   Respiratory: normal effort  Breast: Deferred  Abdomen: Soft, non-tender.  Pelvic Exam:     Vulva: external female genitalia are normal in appearance. No lesions    Urethra - no lesions, no erythema    Vagina: moist, pink, normal ruggae    Cervix: pink, smooth, no lesions, no CMT    Uterus - non-tender, normal size, shape, contour, mobile    Ovaries: non-tender, no appreciable masses  Pap Smear performed: Yes  Extremeties: Legs are symmetric and without tenderness. There is no edema present.    Assessment & Plan  Jackelyn Tobias is a 34 y.o. female who presents today for an annual exam.    1. Women's annual routine gynecological examination  - THINPREP PAP WITH HPV; Future    -pelvic exam completed  -Pap: collected    -Mammogram: NA   -Colonoscopy: NA   -Advised on breast self awareness  -contraception: partner has a vasectomy, desires removal of the Mirena today, see procedure note     2. Encounter for IUD removal  - Consent for all Surgical, Special Diagnostic or Therapeutic Procedures  - IUD Removal     Return: Annually or PRN    Ines Alan M.D.

## 2024-04-03 NOTE — PROCEDURES
IUD Removal    Date/Time: 4/3/2024 4:21 PM    Performed by: Ines Alan M.D.  Authorized by: Ines Alan M.D.    Consent:     Consent obtained:  Verbal and written    Consent given by:  Patient    Procedure risks and benefits discussed: yes      Patient questions answered: yes      Patient agrees, verbalizes understanding, and wants to proceed: yes    Universal protocol:     Patient states understanding of procedure being performed: yes      Relevant documents present and verified: yes    Pre-procedure details:     Reason for removal: other      IUD placed at this facility: no      Length of time IUD in place:  4 years  Procedure:     Pelvic exam performed: yes      Speculum placed: yes      IUD strings visualized in external os: yes      Removal mechanism:  Ring forceps    IUD removed intact: yes      IUD type removed:  Mirena    Removal complications: no    Post-procedure:     New birth control prescribed: no      Counseling regarding contraception given: no    Comments:      Partner has vasectomy.

## 2024-04-06 LAB
CYTOLOGIST CVX/VAG CYTO: NORMAL
CYTOLOGY CVX/VAG DOC CYTO: NORMAL
CYTOLOGY CVX/VAG DOC THIN PREP: NORMAL
HPV I/H RISK 4 DNA CVX QL PROBE+SIG AMP: NEGATIVE
NOTE NL11727A: NORMAL
OTHER STN SPEC: NORMAL
STAT OF ADQ CVX/VAG CYTO-IMP: NORMAL

## 2024-04-13 ENCOUNTER — OFFICE VISIT (OUTPATIENT)
Dept: URGENT CARE | Facility: PHYSICIAN GROUP | Age: 34
End: 2024-04-13
Payer: COMMERCIAL

## 2024-04-13 ENCOUNTER — APPOINTMENT (OUTPATIENT)
Dept: RADIOLOGY | Facility: IMAGING CENTER | Age: 34
End: 2024-04-13
Attending: NURSE PRACTITIONER
Payer: COMMERCIAL

## 2024-04-13 VITALS
TEMPERATURE: 97.7 F | BODY MASS INDEX: 24.83 KG/M2 | RESPIRATION RATE: 20 BRPM | OXYGEN SATURATION: 98 % | DIASTOLIC BLOOD PRESSURE: 74 MMHG | SYSTOLIC BLOOD PRESSURE: 124 MMHG | HEART RATE: 85 BPM | WEIGHT: 149 LBS | HEIGHT: 65 IN

## 2024-04-13 DIAGNOSIS — R06.9 UNSPECIFIED ABNORMALITIES OF BREATHING: ICD-10-CM

## 2024-04-13 DIAGNOSIS — R06.02 SHORTNESS OF BREATH: ICD-10-CM

## 2024-04-13 LAB — GLUCOSE BLD-MCNC: 89 MG/DL (ref 65–99)

## 2024-04-13 PROCEDURE — 3078F DIAST BP <80 MM HG: CPT | Performed by: NURSE PRACTITIONER

## 2024-04-13 PROCEDURE — 82962 GLUCOSE BLOOD TEST: CPT | Performed by: NURSE PRACTITIONER

## 2024-04-13 PROCEDURE — 3074F SYST BP LT 130 MM HG: CPT | Performed by: NURSE PRACTITIONER

## 2024-04-13 PROCEDURE — 71046 X-RAY EXAM CHEST 2 VIEWS: CPT | Mod: TC,FY

## 2024-04-13 PROCEDURE — 99214 OFFICE O/P EST MOD 30 MIN: CPT | Performed by: NURSE PRACTITIONER

## 2024-04-13 PROCEDURE — 93000 ELECTROCARDIOGRAM COMPLETE: CPT | Performed by: NURSE PRACTITIONER

## 2024-04-13 ASSESSMENT — ENCOUNTER SYMPTOMS
BACK PAIN: 1
SHORTNESS OF BREATH: 1

## 2024-04-13 NOTE — PROGRESS NOTES
"Subjective:     Jackelyn Tobias is a 34 y.o. female who presents for Shortness of Breath (X 3 wks, this last week pt states her breathing has gotten worse/Feels like she can't get a deep breath), Chest Pain (Pt states it's more like chest pressure ), and Back Pain (Upper back pain)      Shortness of Breath  Associated symptoms include chest pain.   Chest Pain   Associated symptoms include back pain and shortness of breath.   Back Pain  Associated symptoms include chest pain.     Pt presents for evaluation of a new problem. Jackelyn is a very pleasant 34-year-old female presents to urgent care today with complaints of shortness of breath and difficulty breathing for the past 3 weeks.  Her breathing is progressively worsening and is now starting to cause her anxiety as she does feel as though she is not getting enough air.  She does also endorse right-sided chest pain and feels as though she needs to \"crack her chest\".  This pain is described as pressure.  She also is complaining of upper back pain, dizziness and headaches.  She is not using any medication for her symptoms.  She denies any respiratory diseases including asthma or chronic bronchitis.  Negative for smoking history.  She has not been ill recently and denies any congestion, sore throat, fever/chills or cough.    Review of Systems   Respiratory:  Positive for shortness of breath.    Cardiovascular:  Positive for chest pain.   Musculoskeletal:  Positive for back pain.       PMH:   Past Medical History:   Diagnosis Date    Migraine     Moderate episode of recurrent major depressive disorder (HCC) 01/28/2022    Postpartum care following vaginal delivery 07/30/2020     ALLERGIES: No Known Allergies  SURGHX: No past surgical history on file.  SOCHX:   Social History     Socioeconomic History    Marital status:    Tobacco Use    Smoking status: Never    Smokeless tobacco: Never   Vaping Use    Vaping Use: Never used   Substance and Sexual Activity " "   Alcohol use: Not Currently    Drug use: Never    Sexual activity: Yes     Partners: Male     Birth control/protection: Male Sterilization     FH:   Family History   Problem Relation Age of Onset    Diabetes Mother     Arthritis Mother     No Known Problems Father     No Known Problems Brother     No Known Problems Maternal Grandmother     Diabetes Maternal Grandfather     Heart Disease Paternal Grandmother     Kidney Disease Paternal Grandfather          Objective:   /74   Pulse 85   Temp 36.5 °C (97.7 °F) (Temporal)   Resp 14   Ht 1.651 m (5' 5\")   Wt 67.6 kg (149 lb)   LMP 06/20/2020   SpO2 98%   BMI 24.79 kg/m²     Physical Exam  Vitals and nursing note reviewed.   Constitutional:       General: She is not in acute distress.     Appearance: Normal appearance. She is normal weight. She is not ill-appearing or toxic-appearing.   HENT:      Head: Normocephalic.      Right Ear: External ear normal.      Left Ear: External ear normal.      Nose: No congestion or rhinorrhea.      Mouth/Throat:      Pharynx: No oropharyngeal exudate or posterior oropharyngeal erythema.   Eyes:      General:         Right eye: No discharge.         Left eye: No discharge.      Pupils: Pupils are equal, round, and reactive to light.   Cardiovascular:      Rate and Rhythm: Normal rate and regular rhythm.      Pulses: Normal pulses.      Heart sounds: Normal heart sounds. No murmur heard.     Comments: ECG performed in clinic.  ECG: Normal sinus rhythm rate of 63  Pulmonary:      Effort: Respiratory distress present.      Comments: Kussmaul-like respirations present with prolonged inspiration.   Abdominal:      General: Abdomen is flat.   Musculoskeletal:         General: Normal range of motion.      Cervical back: Normal range of motion and neck supple.   Skin:     General: Skin is dry.   Neurological:      General: No focal deficit present.      Mental Status: She is alert and oriented to person, place, and time. Mental " status is at baseline.   Psychiatric:         Mood and Affect: Mood normal.         Behavior: Behavior normal.         Thought Content: Thought content normal.         Judgment: Judgment normal.       DX-CHEST-2 VIEWS    Result Date: 4/13/2024 4/13/2024 3:21 PM HISTORY/REASON FOR EXAM:  Shortness of Breath. TECHNIQUE/EXAM DESCRIPTION AND NUMBER OF VIEWS: Two views of the chest. COMPARISON:  None. FINDINGS: Cardiomediastinal silhouette is normal. No focal consolidation, pleural effusion, pulmonary edema or pneumothorax. No acute osseous abnormality.     No acute cardiopulmonary abnormality.   Results for orders placed or performed in visit on 04/13/24   POCT Glucose   Result Value Ref Range    Glucose - Accu-Ck 89 65 - 99 mg/dL       Assessment/Plan:   Assessment    1. Unspecified abnormalities of breathing  DX-CHEST-2 VIEWS    POCT Glucose    EKG - Clinic Performed      2. Shortness of breath  DX-CHEST-2 VIEWS    EKG - Clinic Performed        Differential diagnoses discussed with patient.  At this time x-ray negative for acute findings.  Unfortunately, as it is the weekend and almost 5 PM I am unable to order additional testing.  Her symptoms have been progressively worsening over the past 3 weeks and are concerning.  I did encourage her to follow-up in the emergency room for higher level of care and further evaluation.  She verbalizes agreement understanding of this plan of care.  An  was used for the duration of our visit today.  Time spent evaluating this patient was at least 30 minutes and includes preparing for visit, counseling/education, exam and evaluation, obtaining history, independent interpretation and ordering labs/tests/procedures.